# Patient Record
Sex: FEMALE | Race: WHITE | NOT HISPANIC OR LATINO | Employment: UNEMPLOYED | URBAN - METROPOLITAN AREA
[De-identification: names, ages, dates, MRNs, and addresses within clinical notes are randomized per-mention and may not be internally consistent; named-entity substitution may affect disease eponyms.]

---

## 2021-05-26 LAB — HM PAP SMEAR: NORMAL

## 2024-01-22 ENCOUNTER — TELEPHONE (OUTPATIENT)
Dept: OBSTETRICS AND GYNECOLOGY | Facility: CLINIC | Age: 29
End: 2024-01-22

## 2024-01-22 DIAGNOSIS — Z32.00 PREGNANCY EXAMINATION OR TEST, PREGNANCY UNCONFIRMED: Primary | ICD-10-CM

## 2024-01-22 NOTE — TELEPHONE ENCOUNTER
Patient is scheduled for her initial ob appt in March. Please sign attached order for HCG. Her LMP was 11-23

## 2024-01-23 ENCOUNTER — TELEPHONE (OUTPATIENT)
Dept: OBSTETRICS AND GYNECOLOGY | Facility: CLINIC | Age: 29
End: 2024-01-23

## 2024-01-23 NOTE — TELEPHONE ENCOUNTER
HUB TO RELAY    Left message for a patient.  has signed the order for her [facility]. She can come into office Monday-Friday from 9am-11am or 1 PM-4:30 PM to get this lab done.

## 2024-01-23 NOTE — TELEPHONE ENCOUNTER
Name: Benita KEEN     Relationship: SELF    Best Callback Number:     [phone]       HUB PROVIDED THE RELAY MESSAGE FROM THE OFFICE   PATIENT VOICED UNDERSTANDING AND HAS NO FURTHER QUESTIONS AT THIS TIME

## 2024-01-27 ENCOUNTER — HOSPITAL ENCOUNTER (EMERGENCY)
Facility: HOSPITAL | Age: 29
Discharge: HOME | End: 2024-01-27
Attending: EMERGENCY MEDICINE
Payer: MEDICAID

## 2024-01-27 ENCOUNTER — APPOINTMENT (OUTPATIENT)
Dept: ULTRASOUND IMAGING | Facility: HOSPITAL | Age: 29
End: 2024-01-27
Payer: MEDICAID

## 2024-01-27 VITALS
RESPIRATION RATE: 15 BRPM | DIASTOLIC BLOOD PRESSURE: 96 MMHG | OXYGEN SATURATION: 99 % | BODY MASS INDEX: 26.12 KG/M2 | HEART RATE: 113 BPM | TEMPERATURE: 98.2 F | SYSTOLIC BLOOD PRESSURE: 135 MMHG | HEIGHT: 62 IN

## 2024-01-27 DIAGNOSIS — O20.9 VAGINAL BLEEDING IN PREGNANCY, FIRST TRIMESTER: ICD-10-CM

## 2024-01-27 DIAGNOSIS — O23.41 URINARY TRACT INFECTION IN MOTHER DURING FIRST TRIMESTER OF PREGNANCY: Primary | ICD-10-CM

## 2024-01-27 LAB
ABO GROUP BLD: NORMAL
BACTERIA UR QL AUTO: ABNORMAL /HPF
BASOPHILS # BLD AUTO: 0.04 10*3/MM3 (ref 0–0.2)
BASOPHILS NFR BLD AUTO: 0.4 % (ref 0–1.5)
BILIRUB UR QL STRIP: NEGATIVE
CLARITY UR: ABNORMAL
COLOR UR: ABNORMAL
DEPRECATED RDW RBC AUTO: 40.3 FL (ref 37–54)
EOSINOPHIL # BLD AUTO: 0.03 10*3/MM3 (ref 0–0.4)
EOSINOPHIL NFR BLD AUTO: 0.3 % (ref 0.3–6.2)
ERYTHROCYTE [DISTWIDTH] IN BLOOD BY AUTOMATED COUNT: 12.4 % (ref 12.3–15.4)
GLUCOSE UR STRIP-MCNC: NEGATIVE MG/DL
HCG INTACT+B SERPL-ACNC: NORMAL MIU/ML
HCT VFR BLD AUTO: 40.5 % (ref 34–46.6)
HGB BLD-MCNC: 13.3 G/DL (ref 12–15.9)
HGB UR QL STRIP.AUTO: ABNORMAL
HOLD SPECIMEN: NORMAL
HOLD SPECIMEN: NORMAL
HYALINE CASTS UR QL AUTO: ABNORMAL /LPF
IMM GRANULOCYTES # BLD AUTO: 0.04 10*3/MM3 (ref 0–0.05)
IMM GRANULOCYTES NFR BLD AUTO: 0.4 % (ref 0–0.5)
KETONES UR QL STRIP: ABNORMAL
LEUKOCYTE ESTERASE UR QL STRIP.AUTO: ABNORMAL
LYMPHOCYTES # BLD AUTO: 1.72 10*3/MM3 (ref 0.7–3.1)
LYMPHOCYTES NFR BLD AUTO: 17.6 % (ref 19.6–45.3)
MCH RBC QN AUTO: 29 PG (ref 26.6–33)
MCHC RBC AUTO-ENTMCNC: 32.8 G/DL (ref 31.5–35.7)
MCV RBC AUTO: 88.4 FL (ref 79–97)
MONOCYTES # BLD AUTO: 0.56 10*3/MM3 (ref 0.1–0.9)
MONOCYTES NFR BLD AUTO: 5.7 % (ref 5–12)
NEUTROPHILS NFR BLD AUTO: 7.41 10*3/MM3 (ref 1.7–7)
NEUTROPHILS NFR BLD AUTO: 75.6 % (ref 42.7–76)
NITRITE UR QL STRIP: POSITIVE
NRBC BLD AUTO-RTO: 0 /100 WBC (ref 0–0.2)
PH UR STRIP.AUTO: 8 [PH] (ref 5–8)
PLATELET # BLD AUTO: 275 10*3/MM3 (ref 140–450)
PMV BLD AUTO: 9.4 FL (ref 6–12)
PROT UR QL STRIP: ABNORMAL
RBC # BLD AUTO: 4.58 10*6/MM3 (ref 3.77–5.28)
RBC # UR STRIP: ABNORMAL /HPF
REF LAB TEST METHOD: ABNORMAL
RH BLD: POSITIVE
SP GR UR STRIP: 1.02 (ref 1–1.03)
SQUAMOUS #/AREA URNS HPF: ABNORMAL /HPF
UROBILINOGEN UR QL STRIP: ABNORMAL
WBC # UR STRIP: ABNORMAL /HPF
WBC NRBC COR # BLD AUTO: 9.8 10*3/MM3 (ref 3.4–10.8)
WHOLE BLOOD HOLD COAG: NORMAL
WHOLE BLOOD HOLD SPECIMEN: NORMAL

## 2024-01-27 PROCEDURE — 81001 URINALYSIS AUTO W/SCOPE: CPT | Performed by: EMERGENCY MEDICINE

## 2024-01-27 PROCEDURE — 25010000002 CEFTRIAXONE PER 250 MG: Performed by: NURSE PRACTITIONER

## 2024-01-27 PROCEDURE — 86900 BLOOD TYPING SEROLOGIC ABO: CPT | Performed by: EMERGENCY MEDICINE

## 2024-01-27 PROCEDURE — 87077 CULTURE AEROBIC IDENTIFY: CPT | Performed by: EMERGENCY MEDICINE

## 2024-01-27 PROCEDURE — 87186 SC STD MICRODIL/AGAR DIL: CPT | Performed by: EMERGENCY MEDICINE

## 2024-01-27 PROCEDURE — 96375 TX/PRO/DX INJ NEW DRUG ADDON: CPT

## 2024-01-27 PROCEDURE — 96365 THER/PROPH/DIAG IV INF INIT: CPT

## 2024-01-27 PROCEDURE — 99284 EMERGENCY DEPT VISIT MOD MDM: CPT

## 2024-01-27 PROCEDURE — 76817 TRANSVAGINAL US OBSTETRIC: CPT

## 2024-01-27 PROCEDURE — 25010000002 ONDANSETRON PER 1 MG: Performed by: NURSE PRACTITIONER

## 2024-01-27 PROCEDURE — 86901 BLOOD TYPING SEROLOGIC RH(D): CPT | Performed by: EMERGENCY MEDICINE

## 2024-01-27 PROCEDURE — 25810000003 SODIUM CHLORIDE 0.9 % SOLUTION: Performed by: NURSE PRACTITIONER

## 2024-01-27 PROCEDURE — 84702 CHORIONIC GONADOTROPIN TEST: CPT | Performed by: EMERGENCY MEDICINE

## 2024-01-27 PROCEDURE — 87086 URINE CULTURE/COLONY COUNT: CPT | Performed by: EMERGENCY MEDICINE

## 2024-01-27 PROCEDURE — 85025 COMPLETE CBC W/AUTO DIFF WBC: CPT | Performed by: EMERGENCY MEDICINE

## 2024-01-27 RX ORDER — SODIUM CHLORIDE 0.9 % (FLUSH) 0.9 %
10 SYRINGE (ML) INJECTION AS NEEDED
Status: DISCONTINUED | OUTPATIENT
Start: 2024-01-27 | End: 2024-01-27 | Stop reason: HOSPADM

## 2024-01-27 RX ORDER — ONDANSETRON 2 MG/ML
4 INJECTION INTRAMUSCULAR; INTRAVENOUS ONCE
Status: COMPLETED | OUTPATIENT
Start: 2024-01-27 | End: 2024-01-27

## 2024-01-27 RX ORDER — CEPHALEXIN 500 MG/1
500 CAPSULE ORAL 2 TIMES DAILY
Qty: 14 CAPSULE | Refills: 0 | Status: SHIPPED | OUTPATIENT
Start: 2024-01-27 | End: 2024-02-03

## 2024-01-27 RX ADMIN — SODIUM CHLORIDE 1000 ML: 9 INJECTION, SOLUTION INTRAVENOUS at 17:26

## 2024-01-27 RX ADMIN — CEFTRIAXONE SODIUM 1000 MG: 1 INJECTION, POWDER, FOR SOLUTION INTRAMUSCULAR; INTRAVENOUS at 17:16

## 2024-01-27 RX ADMIN — ONDANSETRON HYDROCHLORIDE 4 MG: 2 SOLUTION INTRAMUSCULAR; INTRAVENOUS at 17:20

## 2024-01-27 NOTE — DISCHARGE INSTRUCTIONS
Rest, put your feet up as much as possible  Drink plenty of water  Take the full course of keflex as prescribed  Take tylenol as needed for pain  Follow up with your Ob/Gyn as soon as is feasible  Pelvic rest until you follow up  Return to the ER for worsening bleeding or pain, onset of fever, chest pain, shortness of breath or any other symptoms of concern

## 2024-01-27 NOTE — ED PROVIDER NOTES
Time: 3:43 PM EST  Date of encounter:  1/27/2024  Independent Historian/Clinical History and Information was obtained by:   Patient    History is limited by: N/A    Chief Complaint: vaginal bleeding      History of Present Illness:  Patient is a 28 y.o. year old female who presents to the emergency department for evaluation of vaginal bleeding. She says she is 9weeks pregnant, LMP 11/23/23. She had an ultrasound at [facility] a few weeks ago to confirm pregnancy. He appt with OB/Gyn is 3/14/24. She woke up from a nap this afternoon about 3pm and started having vaginal bleeding with large clots. She is complaining of lower abdominal cramping.    HPI    Patient Care Team  Primary Care Provider: Cori Waldrop APRN    Past Medical History:     Allergies   Allergen Reactions    Haemophilus Influenzae Vaccines Unknown - High Severity    Influenza Virus Vaccine [Influenza Virus Vac Live Quad] Unknown - High Severity     Past Medical History:   Diagnosis Date    Anemia complicating pregnancy, unspecified trimester     ASYMPTOMATIC    Cholelithiasis     Chronic hepatitis C complicating pregnancy, antepartum 05/05/2022 5/5/2022 Quantitative values qtrimester; referral to GI PP for treatment     Constipation, unspecified     Hepatitis C     ON MEDS- ON CURRENT PROBLEMS    History of blood transfusion [facility], G3     Hx of chlamydia infection     L4-L5 disc bulge 04/07/2015    Migraine     Other specified noninflammatory disorders of vagina     Unspecified viral hepatitis C without hepatic coma      Past Surgical History:   Procedure Laterality Date    CHOLECYSTECTOMY N/A 03/29/2023    Procedure: CHOLECYSTECTOMY LAPAROSCOPIC;  Surgeon: Lai Miller MD;  Location: formerly [facility] MAIN OR;  Service: General;  Laterality: N/A;    EYE SURGERY Bilateral     x2    HERNIA REPAIR  2009    Umbilical, mesh    WISDOM TOOTH EXTRACTION       Family History   Problem Relation Age of Onset    Breast cancer Mother      "Ovarian cancer Mother     Thyroid disease Maternal Grandmother     Heart disease Maternal Grandmother     Diabetes Maternal Grandmother     Breast cancer Maternal Aunt     Uterine cancer Neg Hx     Colon cancer Neg Hx     Melanoma Neg Hx     Clotting disorder Neg Hx        Home Medications:  Prior to Admission medications    Medication Sig Start Date End Date Taking? Authorizing Provider   amoxicillin (AMOXIL) 500 MG capsule Take 1 capsule by mouth 2 (Two) Times a Day. 1/3/24   Wendy Buck APRN   diclofenac (VOLTAREN) 50 MG EC tablet Take 1 tablet by mouth 2 (Two) Times a Day As Needed.    Provider, Historical, MD        Social History:   Social History     Tobacco Use    Smoking status: Never     Passive exposure: Never    Smokeless tobacco: Never   Vaping Use    Vaping Use: Former    Substances: Nicotine, Flavoring    Devices: Disposable   Substance Use Topics    Alcohol use: Never    Drug use: Never         Review of Systems:  Review of Systems   Constitutional: Negative.    HENT: Negative.     Eyes: Negative.    Respiratory: Negative.     Cardiovascular: Negative.    Gastrointestinal:  Positive for abdominal pain.   Endocrine: Negative.    Genitourinary:  Positive for vaginal bleeding.   Musculoskeletal: Negative.    Skin: Negative.    Allergic/Immunologic: Negative.    Neurological: Negative.    Hematological: Negative.    Psychiatric/Behavioral: Negative.          Physical Exam:  /96 (BP Location: Right arm, Patient Position: Sitting)   Pulse 113   Temp 98.2 °F (36.8 °C) (Oral)   Resp 15   Ht 157.5 cm (62.01\")   LMP 11/23/2023 (Exact Date)   SpO2 99%   BMI 26.12 kg/m²     Physical Exam  Constitutional:       Appearance: Normal appearance.   HENT:      Head: Normocephalic and atraumatic.      Nose: Nose normal.      Mouth/Throat:      Mouth: Mucous membranes are moist.   Eyes:      Pupils: Pupils are equal, round, and reactive to light.   Cardiovascular:      Rate and Rhythm: Normal rate " and regular rhythm.      Pulses: Normal pulses.   Pulmonary:      Effort: Pulmonary effort is normal.      Breath sounds: Normal breath sounds.   Abdominal:      General: Abdomen is flat. Bowel sounds are normal.      Palpations: Abdomen is soft.      Tenderness: There is abdominal tenderness in the suprapubic area.   Musculoskeletal:         General: Normal range of motion.      Cervical back: Normal range of motion.   Skin:     General: Skin is warm and dry.      Capillary Refill: Capillary refill takes less than 2 seconds.   Neurological:      General: No focal deficit present.      Mental Status: She is alert and oriented to person, place, and time. Mental status is at baseline.   Psychiatric:         Mood and Affect: Mood normal.                  Procedures:  Procedures      Medical Decision Making:      Comorbidities that affect care:    Pregnancy    External Notes reviewed:    None      The following orders were placed and all results were independently analyzed by me:  Orders Placed This Encounter   Procedures    Urine Culture - Urine,    US Ob Transvaginal    Urinalysis With Culture If Indicated - Urine, Clean Catch    [place] Draw    hCG, Quantitative, Pregnancy    CBC Auto Differential    Urinalysis, Microscopic Only - Urine, Clean Catch    NPO Diet NPO Type: Strict NPO    Undress & Gown    Vital Signs    Orthostatic Blood Pressure    Supplies To Bedside - Notify MD When Ready- Pelvic Cart / Set Up    Pulse Oximetry    Oxygen Therapy- Nasal Cannula; Titrate 1-6 LPM Per SpO2; 90 - 95%    ABO / Rh    Insert Peripheral IV    CBC & Differential    Green Top (Gel)    Lavender Top    Gold Top - SST    Light Blue Top       Medications Given in the Emergency Department:  Medications   sodium chloride 0.9 % flush 10 mL (has no administration in time range)   sodium chloride 0.9 % bolus 1,000 mL (1,000 mL Intravenous New Bag 1/27/24 1726)   ondansetron (ZOFRAN) injection 4 mg (4 mg Intravenous Given 1/27/24 1720)    cefTRIAXone (ROCEPHIN) 1000 mg/50 mL 0.9% sodium chloride MBP (1,000 mg Intravenous New Bag 1/27/24 1716)        ED Course:         Labs:    Lab Results (last 24 hours)       Procedure Component Value Units Date/Time    CBC & Differential [[id number]]  (Abnormal) Collected: 01/27/24 1613    Specimen: Blood Updated: 01/27/24 1622    Narrative:      The following orders were created for panel order CBC & Differential.  Procedure                               Abnormality         Status                     ---------                               -----------         ------                     CBC Auto Differential[[id number]]        Abnormal            Final result                 Please view results for these tests on the individual orders.    hCG, Quantitative, Pregnancy [[id number]] Collected: 01/27/24 1613    Specimen: Blood Updated: 01/27/24 1722     HCG Quantitative 103,125.00 mIU/mL     Narrative:      HCG Ranges by Gestational Age    Females - non-pregnant premenopausal   </= 1mIU/mL HCG  Females - postmenopausal               </= 7mIU/mL HCG    3 Weeks       5.4   -      72 mIU/mL  4 Weeks      10.2   -     708 mIU/mL  5 Weeks       217   -   8,245 mIU/mL  6 Weeks       152   -  32,177 mIU/mL  7 Weeks     4,059   - 153,767 mIU/mL  8 Weeks    31,366   - 149,094 mIU/mL  9 Weeks    59,109   - 135,901 mIU/mL  10 Weeks   44,186   - 170,409 mIU/mL  12 Weeks   27,107   - 201,615 mIU/mL  14 Weeks   24,302   -  93,646 mIU/mL  15 Weeks   12,540   -  69,747 mIU/mL  16 Weeks    8,904   -  55,332 mIU/mL  17 Weeks    8,240   -  51,793 mIU/mL  18 Weeks    9,649   -  55,271 mIU/mL      CBC Auto Differential [[id number]]  (Abnormal) Collected: 01/27/24 1613    Specimen: Blood Updated: 01/27/24 1622     WBC 9.80 10*3/mm3      RBC 4.58 10*6/mm3      Hemoglobin 13.3 g/dL      Hematocrit 40.5 %      MCV 88.4 fL      MCH 29.0 pg      MCHC 32.8 g/dL      RDW 12.4 %      RDW-SD 40.3 fl      MPV 9.4 fL      Platelets 275 10*3/mm3       Neutrophil % 75.6 %      Lymphocyte % 17.6 %      Monocyte % 5.7 %      Eosinophil % 0.3 %      Basophil % 0.4 %      Immature Grans % 0.4 %      Neutrophils, Absolute 7.41 10*3/mm3      Lymphocytes, Absolute 1.72 10*3/mm3      Monocytes, Absolute 0.56 10*3/mm3      Eosinophils, Absolute 0.03 10*3/mm3      Basophils, Absolute 0.04 10*3/mm3      Immature Grans, Absolute 0.04 10*3/mm3      nRBC 0.0 /100 WBC     Urinalysis With Culture If Indicated - Urine, Clean Catch [[id number]]  (Abnormal) Collected: 01/27/24 1622    Specimen: Urine, Clean Catch Updated: 01/27/24 1648     Color, UA [place]     Appearance, UA Turbid     pH, UA 8.0     Specific Gravity, UA 1.022     Glucose, UA Negative     Ketones, UA Trace     Bilirubin, UA Negative     Blood, UA Large (3+)     Protein, UA --     Comment: Result not available due to interfering substances.        Leuk Esterase, UA Small (1+)     Nitrite, UA Positive     Urobilinogen, UA 1.0 E.U./dL    Narrative:      In absence of clinical symptoms, the presence of pyuria, bacteria, and/or nitrites on the urinalysis result does not correlate with infection.    Urinalysis, Microscopic Only - Urine, Clean Catch [[id number]]  (Abnormal) Collected: 01/27/24 1622    Specimen: Urine, Clean Catch Updated: 01/27/24 1648     RBC, UA Too Numerous to Count /HPF      WBC, UA 11-20 /HPF      Bacteria, UA 4+ /HPF      Squamous Epithelial Cells, UA 7-12 /HPF      Hyaline Casts, UA 0-2 /LPF      Methodology Automated Microscopy    Urine Culture - Urine, Urine, Clean Catch [[id number]] Collected: 01/27/24 1622    Specimen: Urine, Clean Catch Updated: 01/27/24 1636             Imaging:    US Ob Transvaginal    Result Date: 1/27/2024  PROCEDURE: US OB TRANSVAGINAL  COMPARISON: None  INDICATIONS: vaginal bleeding  TECHNIQUE: Transvaginal OB pelvic ultrasound.   FINDINGS:   Single living intrauterine gestation.  Estimated gestational age by ultrasound 7 weeks 6 days.  Estimated date of delivery 9/8/2024.   Crown rump length 1.47 cm. Fetal heart rate 182 bpm.  The ovaries are not visualized.  No abnormal pelvic fluid collections are identified.  IMPRESSION: Single living intrauterine gestation.  Estimated gestational age 7 weeks 6 days.  Estimated date of delivery 9/8/2024.   BRODERICK ZIEGLER MD       Electronically Signed and Approved By: BRODERICK ZIEGLER MD on 1/27/2024 at 18:15                Differential Diagnosis and Discussion:    Vaginal Discharge: Differential diagnosis includes but is not limited to bacterial vaginosis, candidiasis, trichomonas vaginitis, cervicitis, rectovaginal fistula, irritants and allergens, foreign body, pelvic inflammatory disease.    All labs were reviewed and interpreted by me.  Ultrasound impression was interpreted by me.     MDM     Amount and/or Complexity of Data Reviewed  Clinical lab tests: reviewed  Tests in the radiology section of CPT®: reviewed                 Patient Care Considerations:           Consultants/Shared Management Plan:    None    Social Determinants of Health:           Disposition and Care Coordination:    Discharged: The patient is suitable and stable for discharge with no need for consideration of admission.    I have explained the patient´s condition, diagnoses and treatment plan based on the information available to me at this time. I have answered questions and addressed any concerns. The patient has a good  understanding of the patient´s diagnosis, condition, and treatment plan as can be expected at this point. The vital signs have been stable. The patient´s condition is stable and appropriate for discharge from the emergency department.      The patient will pursue further outpatient evaluation with the primary care physician or other designated or consulting physician as outlined in the discharge instructions. They are agreeable to this plan of care and follow-up instructions have been explained in detail. The patient has received these instructions  in written format and have expressed an understanding of the discharge instructions. The patient is aware that any significant change in condition or worsening of symptoms should prompt an immediate return to this or the closest emergency department or call to 911.  I have explained discharge medications and the need for follow up with the patient/caretakers. This was also printed in the discharge instructions. Patient was discharged with the following medications and follow up:      Medication List        New Prescriptions      cephalexin 500 MG capsule  Commonly known as: KEFLEX  Take 1 capsule by mouth 2 (Two) Times a Day for 7 days.            Stop      amoxicillin 500 MG capsule  Commonly known as: AMOXIL     diclofenac 50 MG EC tablet  Commonly known as: VOLTAREN               Where to Get Your Medications        These medications were sent to [id number] - [place], [place] - [address] AT [facility] [address] & [place] - [phone]  - [phone] FX  [address], [place] [address]      Phone: [phone]   cephalexin 500 MG capsule      Cori Waldrop, APRN  [address]  [address]  [place] [address] [phone]      As needed       Final diagnoses:   Urinary tract infection in mother during first trimester of pregnancy   Vaginal bleeding in pregnancy, first trimester        ED Disposition       ED Disposition   Discharge    Condition   Stable    Comment   --               This medical record created using voice recognition software.             Melody Lipscomb, APRN  01/27/24 3363

## 2024-01-27 NOTE — Clinical Note
[facility] EMERGENCY ROOM  [address]  [place] [address]  Phone: [phone]    Benita KEEN was seen and treated in our emergency department on 1/27/2024.  She may return to work on 01/30/2024.         Thank you for choosing [facility].    Melody Lipscomb, APRN

## 2024-01-29 LAB — BACTERIA SPEC AEROBE CULT: ABNORMAL

## 2024-01-29 NOTE — TELEPHONE ENCOUNTER
Left message for patient after seeing she was seen in the ER on 01/27 for vaginal bleeding. Her ultrasound showed a viable IUP at an estimated GA 7w6d. Her BHCG was 103,125.00. I called her to follow up with her on that visit but she did not answer I left a voicemail. Her initial OB appointment is on 03/14. Please advise if any recommendations.

## 2024-01-31 ENCOUNTER — TELEPHONE (OUTPATIENT)
Dept: OBSTETRICS AND GYNECOLOGY | Facility: CLINIC | Age: 29
End: 2024-01-31
Payer: MEDICAID

## 2024-01-31 NOTE — TELEPHONE ENCOUNTER
"Patient called states she was seen at the ER in [place] for bleeding in pregnancy. She states she is still having bleeding and can \"feel it pour out of her\" and changes her pad three times an hour. No records of ER visit in her account after trying multiple ways to pull her up to see if duplicate. Patient advised if bleeding heavily she needs to go back to the ER for evaluation.   "

## 2024-02-01 NOTE — TELEPHONE ENCOUNTER
Spoke with pt who stated she called the office yesterday for heavy vaginal bleeding and was advised to go to the ER but she did not go. She states she contacted her PCP who told her the bleeding was from her having a bad UTI when she was seen in the ER on 01/27. She states the bleeding has stopped. I was able to move her appt up to a sooner date. She will fu as needed until that appointment.

## 2024-02-19 ENCOUNTER — OFFICE VISIT (OUTPATIENT)
Dept: OBSTETRICS AND GYNECOLOGY | Facility: CLINIC | Age: 29
End: 2024-02-19
Payer: COMMERCIAL

## 2024-02-19 VITALS
HEART RATE: 90 BPM | SYSTOLIC BLOOD PRESSURE: 100 MMHG | BODY MASS INDEX: 28.71 KG/M2 | DIASTOLIC BLOOD PRESSURE: 71 MMHG | WEIGHT: 157 LBS

## 2024-02-19 DIAGNOSIS — O20.0 THREATENED ABORTION: Primary | ICD-10-CM

## 2024-02-19 LAB
B-HCG UR QL: POSITIVE
EXPIRATION DATE: ABNORMAL
HCG INTACT+B SERPL-ACNC: 196 MIU/ML
INTERNAL NEGATIVE CONTROL: NEGATIVE
INTERNAL POSITIVE CONTROL: ABNORMAL
Lab: ABNORMAL

## 2024-02-19 PROCEDURE — 84702 CHORIONIC GONADOTROPIN TEST: CPT | Performed by: NURSE PRACTITIONER

## 2024-02-19 PROCEDURE — 81025 URINE PREGNANCY TEST: CPT | Performed by: NURSE PRACTITIONER

## 2024-02-19 PROCEDURE — 99213 OFFICE O/P EST LOW 20 MIN: CPT | Performed by: NURSE PRACTITIONER

## 2024-02-19 NOTE — PROGRESS NOTES
Chief Complaint   Patient presents with    Possible Pregnancy     Recently seen in ER heavy bleeding for 1 week           HPI  Benita KEEN is a 28 y.o. female, , who presents for follow up on bleeding in early pregnancy.    LMP 23, started bleeding on 24 and was evaluated in ED.  States was told she had a severe UTI and that could be causing her bleeding.    US on 24 showed a living IUP which measured 7w6d.  This would make her 11w1d today.  Was treated with Keflex for UTI.    She reports she bled for about a week.   States lost a lot of blood and passed blood clots bigger than her first.  Bleeding has since stopped, having a brown mucus discharge.   Is still having nausea and breast tenderness.   Has had intercourse since her bleeding stopped.  US Ob Transvaginal (2024 18:06)      Recent Tests:  US: brief bedside TAS does not demonstrate IUP  Rh Status: Positive      The additional following portions of the patient's history were reviewed and updated as appropriate: allergies, current medications, past family history, past medical history, past social history, past surgical history, and problem list.    Review of Systems      I have reviewed and agree with the HPI, ROS, and historical information as entered above. Tam Parks, APRN    Objective   /71   Pulse 90   Wt 71.2 kg (157 lb)   LMP 2023 (Exact Date)   BMI 28.71 kg/m²     Physical Exam       Assessment and Plan    Problem List Items Addressed This Visit          Gravid and     Threatened  - Primary    Overview     Seen in ED on 24, IUP at 7w6d.  Bled for a week, passing large clots.  Bleeding has resolved.  No IUP seen on brief TAS, will ordered beta HCG and repeat ultrasound.  Pelvic rest.  Discussed likely SAB, reviewed needed to follow beta HCG to zero. Emotional support provided.         Relevant Orders    hCG, Quantitative, Pregnancy    POC Pregnancy, Urine (Completed)     US Ob Transvaginal       Threatened AB  Repeat U/S in 1 week(s).  Pelvic Rest.  No douching, intercourse or use of tampons.  Call for an increase in bleeding, abdominal pain, or fever.  Emotional support provided   Return for as needed.        Tam Parks, APRN  02/19/2024

## 2024-02-20 ENCOUNTER — TELEPHONE (OUTPATIENT)
Dept: OBSTETRICS AND GYNECOLOGY | Facility: CLINIC | Age: 29
End: 2024-02-20
Payer: COMMERCIAL

## 2024-02-20 DIAGNOSIS — O03.9 SAB (SPONTANEOUS ABORTION): Primary | ICD-10-CM

## 2024-02-20 NOTE — TELEPHONE ENCOUNTER
----- Message from MIKEL Prieto sent at 2/20/2024  9:58 AM EST -----  Please let patient know her beta HCG has dropped to 196, indicating she has miscarried her baby.  Recommend repeat level in one week, pelvic rest until after repeat level drawn.  Does not need to have follow up ultrasound performed, please cancel.

## 2024-07-24 ENCOUNTER — TELEPHONE (OUTPATIENT)
Dept: OBSTETRICS AND GYNECOLOGY | Facility: CLINIC | Age: 29
End: 2024-07-24
Payer: COMMERCIAL

## 2024-07-24 NOTE — TELEPHONE ENCOUNTER
7/24 number listed for patient  not a working number - attempted the spouse  left generic message to have pt call to r/s appt. Provider out of office now due to call schedule change.  Hub to read: transfer to office as days are held for reschedule

## 2024-07-25 ENCOUNTER — OFFICE VISIT (OUTPATIENT)
Dept: INTERNAL MEDICINE | Facility: CLINIC | Age: 29
End: 2024-07-25
Payer: COMMERCIAL

## 2024-07-25 DIAGNOSIS — Z32.01 POSITIVE PREGNANCY TEST: ICD-10-CM

## 2024-07-25 DIAGNOSIS — N92.6 MISSED PERIOD: Primary | ICD-10-CM

## 2024-07-25 LAB
B-HCG UR QL: POSITIVE
EXPIRATION DATE: ABNORMAL
INTERNAL NEGATIVE CONTROL: ABNORMAL
INTERNAL POSITIVE CONTROL: ABNORMAL
Lab: ABNORMAL

## 2024-07-25 PROCEDURE — 81025 URINE PREGNANCY TEST: CPT | Performed by: NURSE PRACTITIONER

## 2024-07-25 PROCEDURE — 99212 OFFICE O/P EST SF 10 MIN: CPT | Performed by: NURSE PRACTITIONER

## 2024-07-25 NOTE — PROGRESS NOTES
Chief Complaint  No period x2 months     Deysi KEEN presents to [facility]  History of Present Illness    Patient presents to the clinic with no menstrual cycle x2 months.     Current Outpatient Medications   Medication Instructions    benzonatate (TESSALON) 100 mg, Oral, 3 Times Daily PRN    promethazine-dextromethorphan (PROMETHAZINE-DM) 6.25-15 MG/5ML syrup 5 mL, Oral, 4 Times Daily PRN       The following portions of the patient's history were reviewed and updated as appropriate: allergies, current medications, past family history, past medical history, past social history, past surgical history, and problem list.    Objective   Vital Signs:   There were no vitals taken for this visit.    BP Readings from Last 3 Encounters:   03/25/24 100/83   02/19/24 100/71   01/27/24 135/96     Wt Readings from Last 3 Encounters:   03/25/24 71.2 kg (156 lb 15.5 oz)   02/19/24 71.2 kg (157 lb)   10/07/23 64.8 kg (142 lb 13.7 oz)     BMI is >= 25 and <30. (Overweight) The following options were offered after discussion;: weight loss educational material (shared in after visit summary), exercise counseling/recommendations, and nutrition counseling/recommendations     Physical Exam     Appearance: No acute distress, well-nourished  Head: normocephalic, atraumatic  Eyes: extraocular movements intact, no scleral icterus, no conjunctival injection  Ears, Nose, and Throat: external ears normal, nares patent, moist mucous membranes  Cardiovascular: regular rate and rhythm. no murmurs, rubs, or gallops. no edema  Respiratory: breathing comfortably, symmetric chest rise, clear to auscultation bilaterally. No wheezes, rales, or rhonchi.  Neuro: alert and oriented to time, place, and person. Normal gait  Psych: normal mood and affect     Result Review :   The following data was reviewed by: MIKEL Mahoney on 07/25/2024:  Common labs           1/27/2024    16:13   Common Labs   WBC 9.80    Hemoglobin 13.3    Hematocrit 40.5    Platelets 275             Lab Results   Component Value Date    SARSANTIGEN Not Detected 01/03/2024    COVID19 Not Detected 12/03/2022    RAPFLUA Negative 09/22/2023    RAPFLUB Negative 09/22/2023    FLUAAG Not Detected 01/03/2024    FLUBAG Not Detected 01/03/2024    RAPSCRN Negative 03/25/2024    POCPREGUR Positive (A) 07/25/2024    BILIRUBINUR Negative 01/27/2024            Assessment and Plan    Diagnoses and all orders for this visit:    1. Missed period (Primary)  -     POCT pregnancy, urine    2. Positive pregnancy test      - start prenatal vitamin.   - schedule OB appt.     There are no discontinued medications.       Follow Up   No follow-ups on file.  Patient was given instructions and counseling regarding her condition or for health maintenance advice. Please see specific information pulled into the AVS if appropriate.       MIKEL Mahoney  07/25/24  10:37 EDT

## 2024-07-26 NOTE — TELEPHONE ENCOUNTER
7/26/24 Attempted to reach the patient number listed for patient  not a working number -Hub to read: transfer to office as days are held for reschedule      **Sent patient a letter regarding needing to do the reschedule

## 2024-08-05 ENCOUNTER — TELEPHONE (OUTPATIENT)
Dept: OBSTETRICS AND GYNECOLOGY | Facility: CLINIC | Age: 29
End: 2024-08-05
Payer: COMMERCIAL

## 2024-08-05 DIAGNOSIS — O36.80X0 PREGNANCY WITH INCONCLUSIVE FETAL VIABILITY, SINGLE OR UNSPECIFIED FETUS: Primary | ICD-10-CM

## 2024-08-08 ENCOUNTER — LAB (OUTPATIENT)
Dept: OBSTETRICS AND GYNECOLOGY | Facility: CLINIC | Age: 29
End: 2024-08-08
Payer: COMMERCIAL

## 2024-08-08 DIAGNOSIS — O36.80X0 PREGNANCY WITH INCONCLUSIVE FETAL VIABILITY, SINGLE OR UNSPECIFIED FETUS: ICD-10-CM

## 2024-08-08 LAB — HCG INTACT+B SERPL-ACNC: NORMAL MIU/ML

## 2024-08-08 PROCEDURE — 84702 CHORIONIC GONADOTROPIN TEST: CPT | Performed by: NURSE PRACTITIONER

## 2024-08-09 ENCOUNTER — TELEPHONE (OUTPATIENT)
Dept: OBSTETRICS AND GYNECOLOGY | Facility: CLINIC | Age: 29
End: 2024-08-09
Payer: COMMERCIAL

## 2024-08-09 NOTE — TELEPHONE ENCOUNTER
"Hub staff attempted to follow warm transfer process and was unsuccessful     Caller: Benita KEEN \"Lalita\"    Relationship to patient: Self    Best call back number: [phone] (home)       Patient is needing: PT CALLED BACK FOR [facility] REGARDING LAB RESULTS. PLEASE CALL BACK, OK TO [facility].    "

## 2024-08-12 ENCOUNTER — INITIAL PRENATAL (OUTPATIENT)
Dept: OBSTETRICS AND GYNECOLOGY | Facility: CLINIC | Age: 29
End: 2024-08-12
Payer: COMMERCIAL

## 2024-08-12 VITALS — BODY MASS INDEX: 27.61 KG/M2 | DIASTOLIC BLOOD PRESSURE: 68 MMHG | WEIGHT: 151 LBS | SYSTOLIC BLOOD PRESSURE: 107 MMHG

## 2024-08-12 DIAGNOSIS — Z34.80 SUPERVISION OF OTHER NORMAL PREGNANCY, ANTEPARTUM: Primary | ICD-10-CM

## 2024-08-12 PROBLEM — O20.0 THREATENED ABORTION: Status: RESOLVED | Noted: 2024-02-19 | Resolved: 2024-08-12

## 2024-08-12 LAB
ABO GROUP BLD: NORMAL
B-HCG UR QL: POSITIVE
BASOPHILS # BLD AUTO: 0.03 10*3/MM3 (ref 0–0.2)
BASOPHILS NFR BLD AUTO: 0.5 % (ref 0–1.5)
BLD GP AB SCN SERPL QL: NEGATIVE
DEPRECATED RDW RBC AUTO: 49.7 FL (ref 37–54)
EOSINOPHIL # BLD AUTO: 0.05 10*3/MM3 (ref 0–0.4)
EOSINOPHIL NFR BLD AUTO: 0.8 % (ref 0.3–6.2)
ERYTHROCYTE [DISTWIDTH] IN BLOOD BY AUTOMATED COUNT: 15.3 % (ref 12.3–15.4)
EXPIRATION DATE: ABNORMAL
GLUCOSE UR STRIP-MCNC: NEGATIVE MG/DL
HBV SURFACE AG SERPL QL IA: NORMAL
HCT VFR BLD AUTO: 38.2 % (ref 34–46.6)
HCV AB SER QL: REACTIVE
HGB BLD-MCNC: 12.2 G/DL (ref 12–15.9)
HIV 1+2 AB+HIV1 P24 AG SERPL QL IA: NORMAL
IMM GRANULOCYTES # BLD AUTO: 0.01 10*3/MM3 (ref 0–0.05)
IMM GRANULOCYTES NFR BLD AUTO: 0.2 % (ref 0–0.5)
INTERNAL NEGATIVE CONTROL: NEGATIVE
INTERNAL POSITIVE CONTROL: ABNORMAL
LYMPHOCYTES # BLD AUTO: 1.49 10*3/MM3 (ref 0.7–3.1)
LYMPHOCYTES NFR BLD AUTO: 22.9 % (ref 19.6–45.3)
Lab: ABNORMAL
MCH RBC QN AUTO: 28.3 PG (ref 26.6–33)
MCHC RBC AUTO-ENTMCNC: 31.9 G/DL (ref 31.5–35.7)
MCV RBC AUTO: 88.6 FL (ref 79–97)
MONOCYTES # BLD AUTO: 0.48 10*3/MM3 (ref 0.1–0.9)
MONOCYTES NFR BLD AUTO: 7.4 % (ref 5–12)
NEUTROPHILS NFR BLD AUTO: 4.46 10*3/MM3 (ref 1.7–7)
NEUTROPHILS NFR BLD AUTO: 68.2 % (ref 42.7–76)
NRBC BLD AUTO-RTO: 0 /100 WBC (ref 0–0.2)
PLATELET # BLD AUTO: 243 10*3/MM3 (ref 140–450)
PMV BLD AUTO: 10.5 FL (ref 6–12)
PROT UR STRIP-MCNC: NEGATIVE MG/DL
RBC # BLD AUTO: 4.31 10*6/MM3 (ref 3.77–5.28)
RH BLD: POSITIVE
WBC NRBC COR # BLD AUTO: 6.52 10*3/MM3 (ref 3.4–10.8)

## 2024-08-12 PROCEDURE — 80307 DRUG TEST PRSMV CHEM ANLYZR: CPT | Performed by: NURSE PRACTITIONER

## 2024-08-12 PROCEDURE — 80081 OBSTETRIC PANEL INC HIV TSTG: CPT | Performed by: NURSE PRACTITIONER

## 2024-08-12 PROCEDURE — 81025 URINE PREGNANCY TEST: CPT | Performed by: NURSE PRACTITIONER

## 2024-08-12 PROCEDURE — 87086 URINE CULTURE/COLONY COUNT: CPT | Performed by: NURSE PRACTITIONER

## 2024-08-12 PROCEDURE — 87624 HPV HI-RISK TYP POOLED RSLT: CPT | Performed by: NURSE PRACTITIONER

## 2024-08-12 PROCEDURE — 87591 N.GONORRHOEAE DNA AMP PROB: CPT | Performed by: NURSE PRACTITIONER

## 2024-08-12 PROCEDURE — 86803 HEPATITIS C AB TEST: CPT | Performed by: NURSE PRACTITIONER

## 2024-08-12 PROCEDURE — G0123 SCREEN CERV/VAG THIN LAYER: HCPCS | Performed by: NURSE PRACTITIONER

## 2024-08-12 PROCEDURE — 87661 TRICHOMONAS VAGINALIS AMPLIF: CPT | Performed by: NURSE PRACTITIONER

## 2024-08-12 PROCEDURE — 87491 CHLMYD TRACH DNA AMP PROBE: CPT | Performed by: NURSE PRACTITIONER

## 2024-08-12 PROCEDURE — 99214 OFFICE O/P EST MOD 30 MIN: CPT | Performed by: NURSE PRACTITIONER

## 2024-08-12 NOTE — PROGRESS NOTES
OB Initial Visit    CC- Here for care of current pregnancy, first visit    Subjective:  29 y.o.  presenting for her first obstetrical visit.    LMP: Patient's last menstrual period was 2024.     Pt has no complaints, is doing well    Happy for pregnancy, anxious due to her miscarriage earlier this year.     Reviewed and updated:  OBHx, GYNHx (STDs), PMHx, Medications, Allergies, PSHx, Social Hx, Preventative Hx (PAP), Hx of abuse/safe environment, Vaccine Hx including hx of chickenpox or vaccine, Genetic Hx (pt, FOB, both families).        Objective:  /68   Wt 68.5 kg (151 lb)   LMP 2024   BMI 27.61 kg/m²      Urine pregnancy test is positive     General- NAD, alert and oriented, appropriate  Psych- Normal mood, good memory  Neck- No masses, no thyroid enlargement  CV- Regular rhythm, no murnurs  Resp- CTA to bases, no wheezes  Abdomen- Soft, non distended, non tender, no masses    Breast left- deferred  Breast right- deferred    External genitalia- Normal, no lesions  Urethra- Normal, no masses, non tender  Vagina- Normal, no discharge  Bladder- Normal, no masses, non tender  Cvx- Normal, no lesions, no discharge, no CMT  Uterus- Normal shape and consistency, non tender, Consistent with dates, Bedside US consistent with dates.  -160..    Adnexa- Normal, no mass, non tender    Lymphatic- No palpable neck, axillary, or groin nodes  Ext- No edema, no cyanosis    Skin- No lesions, no rashes, no acanthosis nigricans    Assessment and Plan:  12w0d  Diagnoses and all orders for this visit:    1. Supervision of other normal pregnancy, antepartum (Primary)  Overview:  ELVIS finalized: 25 per LMP, dating scan scheduled    Optional testing NIPS,CF/SMA,AFP:  Carrier screen pending, NIPS pending    COVID: Fully vaccinated  Tdap:  RSV:  Flu:    Rhogam:  1hr Glucola:  FU TPA w glucola:  ? Desires Sterilization:    Anatomy US:  FU US:    PROBLEM LIST/PLAN:           Orders:  -     POC  Urinalysis Dipstick  -     IGP,CtNgTv,Apt HPV,rfx 16 / 18,45  -     OB Panel With HIV and RPR  -     Urine Culture - Urine, Urine, Clean Catch  -     Urine Drug Screen - Urine, Clean Catch  -     POC Pregnancy, Urine  -     Home Horizon 14 (Pan-Ethnic Standard) - Blood,  -     Home Panorama Prenatal Test: Chromosomes 13, 18, 21, X & Y: Triploidy 22Q.11.2 Deletion - Blood,        Genetic Screening:   CF  NIPS    Vaccines:   s/p COVID vaccine    Counseling:   Nutrition discussed, calories, activity/exercise in pregnancy  Discussed dietary restrictions/safety food preparation in pregnancy  Reviewed what to expect prenatal visits, office providers (female and male) and covering [facility] Hospitalists/Dr. Ocampo  Appropriate trimester precautions provided, N/V, vag bleeding, cramping  Questions answered    Labs:   Prenatal labs, cultures, and PAP performed (prn)    Return in about 4 weeks (around 9/9/2024) for [facility] Office, OB follow up.      Tam Parks, APRN  08/12/2024    [facility]  [facility]  [address] [address]  Dept: [phone]  Dept Fax: [phone]  Loc: [phone]

## 2024-08-13 ENCOUNTER — TELEPHONE (OUTPATIENT)
Dept: OBSTETRICS AND GYNECOLOGY | Facility: CLINIC | Age: 29
End: 2024-08-13
Payer: COMMERCIAL

## 2024-08-13 DIAGNOSIS — B18.2 CHRONIC HEPATITIS C COMPLICATING PREGNANCY, ANTEPARTUM: Primary | ICD-10-CM

## 2024-08-13 DIAGNOSIS — O98.419 CHRONIC HEPATITIS C COMPLICATING PREGNANCY, ANTEPARTUM: Primary | ICD-10-CM

## 2024-08-13 LAB
AMPHET+METHAMPHET UR QL: NEGATIVE
BACTERIA SPEC AEROBE CULT: NORMAL
BARBITURATES UR QL SCN: NEGATIVE
BENZODIAZ UR QL SCN: NEGATIVE
CANNABINOIDS SERPL QL: NEGATIVE
COCAINE UR QL: NEGATIVE
FENTANYL UR-MCNC: NEGATIVE NG/ML
METHADONE UR QL SCN: NEGATIVE
OPIATES UR QL: NEGATIVE
OXYCODONE UR QL SCN: NEGATIVE
RPR SER QL: NORMAL

## 2024-08-13 NOTE — TELEPHONE ENCOUNTER
"  Caller: Benita KEEN \"Lalita\"    Relationship: Self    Best call back number:    [phone]    What is the best time to reach you:     AFTER 4:00 PM    Who are you requesting to speak with (clinical staff, provider,  specific staff member): LIAM    What was the call regarding:     PT RETURNING AMBERS CALLS  PT IS AT WORK AND GETS DONE AT 4:00 AND CAN ANSWER HER PHONE THEN  "

## 2024-08-13 NOTE — TELEPHONE ENCOUNTER
"Caller: Benita KEEN \"Lalita\"    Relationship to patient: Self    Best call back number: [phone]    Patient is needing: PT RETURNING MISSED CALL FROM LIAM THIS MORNING.   "

## 2024-08-14 PROBLEM — O09.899 RUBELLA NON-IMMUNE STATUS, ANTEPARTUM: Status: ACTIVE | Noted: 2024-08-14

## 2024-08-14 PROBLEM — Z28.39 RUBELLA NON-IMMUNE STATUS, ANTEPARTUM: Status: ACTIVE | Noted: 2024-08-14

## 2024-08-14 LAB — RUBV IGG SERPL IA-ACNC: <0.9 INDEX

## 2024-08-18 LAB
Lab: NEGATIVE
Lab: NORMAL
NTRA 22Q11.2 DELETION SYNDROME POPULATION-BASED RISK TEXT: NORMAL
NTRA 22Q11.2 DELETION SYNDROME RESULT TEXT: NORMAL
NTRA 22Q11.2 DELETION SYNDROME RISK SCORE TEXT: NORMAL
NTRA ALPHA-THALASSEMIA: NEGATIVE
NTRA BETA-HEMOGLOBINOPATHIES: NEGATIVE
NTRA CANAVAN DISEASE: NEGATIVE
NTRA CYSTIC FIBROSIS: NEGATIVE
NTRA DUCHENNE/BECKER MUSCULAR DYSTROPHY: NEGATIVE
NTRA FAMILIAL DYSAUTONOMIA: NEGATIVE
NTRA FETAL FRACTION: NORMAL
NTRA FRAGILE X SYNDROME: NEGATIVE
NTRA GALACTOSEMIA: NEGATIVE
NTRA GAUCHER DISEASE: NEGATIVE
NTRA GENDER OF FETUS: NORMAL
NTRA MEDIUM CHAIN ACYL-COA DEHYDROGENASE DEFICIENCY: NEGATIVE
NTRA MONOSOMY X AGE-BASED RISK TEXT: NORMAL
NTRA MONOSOMY X RESULT TEXT: NORMAL
NTRA MONOSOMY X RISK SCORE TEXT: NORMAL
NTRA POLYCYSTIC KIDNEY DISEASE, AUTOSOMAL RECESSIVE: NEGATIVE
NTRA SMITH-LEMLI-OPITZ SYNDROME: NEGATIVE
NTRA SPINAL MUSCULAR ATROPHY: NEGATIVE
NTRA TAY-SACHS DISEASE: NEGATIVE
NTRA TRIPLOIDY RESULT TEXT: NORMAL
NTRA TRISOMY 13 AGE-BASED RISK TEXT: NORMAL
NTRA TRISOMY 13 RESULT TEXT: NORMAL
NTRA TRISOMY 13 RISK SCORE TEXT: NORMAL
NTRA TRISOMY 18 AGE-BASED RISK TEXT: NORMAL
NTRA TRISOMY 18 RESULT TEXT: NORMAL
NTRA TRISOMY 18 RISK SCORE TEXT: NORMAL
NTRA TRISOMY 21 AGE-BASED RISK TEXT: NORMAL
NTRA TRISOMY 21 RESULT TEXT: NORMAL
NTRA TRISOMY 21 RISK SCORE TEXT: NORMAL

## 2024-08-19 LAB

## 2024-08-21 ENCOUNTER — HOSPITAL ENCOUNTER (OUTPATIENT)
Dept: ULTRASOUND IMAGING | Facility: HOSPITAL | Age: 29
Discharge: HOME | End: 2024-08-21
Admitting: NURSE PRACTITIONER
Payer: COMMERCIAL

## 2024-08-21 DIAGNOSIS — O36.80X0 PREGNANCY WITH INCONCLUSIVE FETAL VIABILITY, SINGLE OR UNSPECIFIED FETUS: ICD-10-CM

## 2024-08-21 PROCEDURE — 76801 OB US < 14 WKS SINGLE FETUS: CPT

## 2024-09-04 ENCOUNTER — HOSPITAL ENCOUNTER (EMERGENCY)
Facility: HOSPITAL | Age: 29
Discharge: HOME | End: 2024-09-04
Attending: EMERGENCY MEDICINE
Payer: COMMERCIAL

## 2024-09-04 VITALS
BODY MASS INDEX: 27.67 KG/M2 | HEART RATE: 84 BPM | OXYGEN SATURATION: 98 % | WEIGHT: 150.35 LBS | HEIGHT: 62 IN | TEMPERATURE: 98 F | RESPIRATION RATE: 18 BRPM | DIASTOLIC BLOOD PRESSURE: 75 MMHG | SYSTOLIC BLOOD PRESSURE: 120 MMHG

## 2024-09-04 DIAGNOSIS — R51.9 ACUTE NONINTRACTABLE HEADACHE, UNSPECIFIED HEADACHE TYPE: Primary | ICD-10-CM

## 2024-09-04 PROCEDURE — 63710000001 DIPHENHYDRAMINE PER 50 MG

## 2024-09-04 PROCEDURE — 99283 EMERGENCY DEPT VISIT LOW MDM: CPT

## 2024-09-04 PROCEDURE — 63710000001 ONDANSETRON ODT 4 MG TABLET DISPERSIBLE

## 2024-09-04 RX ORDER — ONDANSETRON 4 MG/1
4 TABLET, ORALLY DISINTEGRATING ORAL EVERY 8 HOURS PRN
Qty: 15 TABLET | Refills: 0 | Status: SHIPPED | OUTPATIENT
Start: 2024-09-04 | End: 2024-09-09

## 2024-09-04 RX ORDER — DIPHENHYDRAMINE HCL 25 MG
25 CAPSULE ORAL ONCE
Status: COMPLETED | OUTPATIENT
Start: 2024-09-04 | End: 2024-09-04

## 2024-09-04 RX ORDER — ACETAMINOPHEN 500 MG
1000 TABLET ORAL ONCE
Status: COMPLETED | OUTPATIENT
Start: 2024-09-04 | End: 2024-09-04

## 2024-09-04 RX ORDER — ONDANSETRON 4 MG/1
4 TABLET, ORALLY DISINTEGRATING ORAL ONCE
Status: COMPLETED | OUTPATIENT
Start: 2024-09-04 | End: 2024-09-04

## 2024-09-04 RX ADMIN — ONDANSETRON 4 MG: 4 TABLET, ORALLY DISINTEGRATING ORAL at 15:47

## 2024-09-04 RX ADMIN — ACETAMINOPHEN 1000 MG: 500 TABLET ORAL at 15:48

## 2024-09-04 RX ADMIN — DIPHENHYDRAMINE HYDROCHLORIDE 25 MG: 25 CAPSULE ORAL at 15:48

## 2024-09-04 NOTE — Clinical Note
[facility] EMERGENCY ROOM  [address] [place] [address]  Phone: [phone]  Fax: [phone]    Jonathan Vanna accompanied Benita KEEN to the emergency department on 9/4/2024. They may return to work on 09/05/2024.        Thank you for choosing [facility].    Holly Hernandez, APRN

## 2024-09-04 NOTE — DISCHARGE INSTRUCTIONS
Home.  Increase your fluid intake.  You may take over-the-counter Tylenol or Benadryl with Zofran for your headache treatment.  Get plenty of rest and eat well-balanced meals.    Call your primary care provider to schedule appointment for follow-up as needed.    Keep your appointment as previously scheduled with your OB/GYN for normal prenatal follow-up.    If symptoms worsen, change in presentation, or you develop new symptoms such as high blood pressure, visual changes, or worsening headache please seek medical attention or return to the ED for further evaluation.

## 2024-09-04 NOTE — ED PROVIDER NOTES
Time: 2:55 PM EDT  Date of encounter:  9/4/2024  Independent Historian/Clinical History and Information was obtained by:   Patient    History is limited by: N/A    Chief Complaint   Patient presents with    Headache    Eye Pain         History of Present Illness:  Patient is a 29 y.o. year old female who presents to the emergency department for evaluation of headache.  Patient reports she started having sharp stabbing pain behind her left eye.  Episodes only last a few seconds and then resolved.  Patient has been having similar episodes for approximately 3 to 4 months.  She has been evaluated by her optometrist and found to have no abnormalities.  Denies injury, nausea, vomiting, visual changes.    Patient Care Team  Primary Care Provider: Cori Waldrop APRN    Past Medical History:     Allergies   Allergen Reactions    Haemophilus Influenzae Vaccines Unknown - High Severity    Influenza Virus Vaccine [Influenza Virus Vac Live Quad] Unknown - High Severity     Past Medical History:   Diagnosis Date    Cholelithiasis     Chronic hepatitis C complicating pregnancy, antepartum 05/05/2022 5/5/2022 Quantitative values qtrimester; referral to GI PP for treatment     Constipation, unspecified     Hepatitis C     ON MEDS- ON CURRENT PROBLEMS    [facility]     Hx of chlamydia infection     L4-L5 disc bulge 04/07/2015    Miscarriage     Feb. 2024    Other specified noninflammatory disorders of vagina     Unspecified viral hepatitis C without hepatic coma      Past Surgical History:   Procedure Laterality Date    CHOLECYSTECTOMY N/A 03/29/2023    Procedure: CHOLECYSTECTOMY LAPAROSCOPIC;  Surgeon: Lai Miller MD;  Location: [facility] OR;  Service: General;  Laterality: N/A;    EYE SURGERY Bilateral     x2    HERNIA REPAIR  2009    Umbilical, mesh    WISDOM TOOTH EXTRACTION       Family History   Problem Relation Age of Onset    Breast cancer Mother     Ovarian cancer Mother   "   Breast cancer Maternal Aunt     Thyroid disease Maternal Grandmother     Heart disease Maternal Grandmother     Diabetes Maternal Grandmother     Uterine cancer Neg Hx     Colon cancer Neg Hx     Melanoma Neg Hx     Clotting disorder Neg Hx     Alcohol abuse Neg Hx     Arthritis Neg Hx     Asthma Neg Hx     Birth defects Neg Hx     Cancer Neg Hx     COPD Neg Hx     Bleeding Disorder Neg Hx     Depression Neg Hx     Drug abuse Neg Hx     Early death Neg Hx     Hearing loss Neg Hx     Hyperlipidemia Neg Hx     Hypertension Neg Hx     Kidney disease Neg Hx     Learning disabilities Neg Hx     Malig Hyperthermia Neg Hx     Mental illness Neg Hx     Mental retardation Neg Hx     Miscarriages / Stillbirths Neg Hx        Home Medications:  Prior to Admission medications    Not on File        Social History:   Social History     Tobacco Use    Smoking status: Never     Passive exposure: Never    Smokeless tobacco: Never   Vaping Use    Vaping status: Every Day    Substances: Nicotine, Flavoring    Devices: Disposable   Substance Use Topics    Alcohol use: Never    Drug use: Never         Review of Systems:  Review of Systems   Constitutional: Negative.    Eyes: Negative.    Respiratory: Negative.     Cardiovascular: Negative.    Gastrointestinal: Negative.    Endocrine: Negative.    Genitourinary: Negative.    Musculoskeletal: Negative.    Skin: Negative.    Allergic/Immunologic: Negative.    Neurological:  Positive for headaches.   Hematological: Negative.    Psychiatric/Behavioral: Negative.          Physical Exam:  /75 (BP Location: Right arm, Patient Position: Lying)   Pulse 84   Temp 98 °F (36.7 °C) (Oral)   Resp 18   Ht 157.5 cm (62\")   Wt 68.2 kg (150 lb 5.7 oz)   LMP 05/20/2024   SpO2 98%   BMI 27.50 kg/m²         Physical Exam  Vitals and nursing note reviewed.   Constitutional:       General: She is not in acute distress.     Appearance: Normal appearance. She is not toxic-appearing.   HENT:      " Head: Normocephalic and atraumatic.      Jaw: There is normal jaw occlusion.      Right Ear: Tympanic membrane, ear canal and external ear normal.      Left Ear: Tympanic membrane, ear canal and external ear normal.      Nose: Nose normal.      Mouth/Throat:      Mouth: Mucous membranes are moist.      Pharynx: Oropharynx is clear.   Eyes:      General: Lids are normal.      Extraocular Movements: Extraocular movements intact.      Conjunctiva/sclera: Conjunctivae normal.      Pupils: Pupils are equal, round, and reactive to light.   Cardiovascular:      Rate and Rhythm: Normal rate and regular rhythm.      Pulses: Normal pulses.      Heart sounds: Normal heart sounds.   Pulmonary:      Effort: Pulmonary effort is normal. No respiratory distress.      Breath sounds: Normal breath sounds. No stridor. No wheezing, rhonchi or rales.   Chest:      Chest wall: No tenderness.   Abdominal:      General: Abdomen is flat. Bowel sounds are normal.      Palpations: Abdomen is soft.      Tenderness: There is no abdominal tenderness. There is no right CVA tenderness, left CVA tenderness, guarding or rebound.   Musculoskeletal:         General: Normal range of motion.      Cervical back: Normal range of motion and neck supple.      Right lower leg: No edema.      Left lower leg: No edema.   Skin:     General: Skin is warm and dry.   Neurological:      Mental Status: She is alert and oriented to person, place, and time. Mental status is at baseline.   Psychiatric:         Mood and Affect: Mood normal.                  Procedures:  Procedures      Medical Decision Making:      Comorbidities that affect care:    Constipation, hep C, cholelithiasis    External Notes reviewed:    Previous Clinic Note: Patient was last seen by MIKEL Desai for prenatal care.      The following orders were placed and all results were independently analyzed by me:  No orders of the defined types were placed in this encounter.      Medications  Given in the Emergency Department:  Medications   acetaminophen (TYLENOL) tablet 1,000 mg (1,000 mg Oral Given 9/4/24 1548)   diphenhydrAMINE (BENADRYL) capsule 25 mg (25 mg Oral Given 9/4/24 1548)   ondansetron ODT (ZOFRAN-ODT) disintegrating tablet 4 mg (4 mg Oral Given 9/4/24 1547)        ED Course:    The patient was initially evaluated in the triage area where orders were placed. The patient was later dispositioned by MIKEL Zelaya.      The patient was advised to stay for completion of workup which includes but is not limited to communication of labs and radiological results, reassessment and plan. The patient was advised that leaving prior to disposition by a provider could result in critical findings that are not communicated to the patient.          Labs:    Lab Results (last 24 hours)       ** No results found for the last 24 hours. **             Imaging:    No Radiology Exams Resulted Within Past 24 Hours      Differential Diagnosis and Discussion:      Headache: Differential diagnosis includes but is not limited to migraine, cluster headache, hypertension, tumor, subarachnoid bleeding, pseudotumor cerebri, temporal arteritis, infections, tension headache, and TMJ syndrome.        MDM  Number of Diagnoses or Management Options  Acute nonintractable headache, unspecified headache type  Diagnosis management comments: The patient presented to the emergency department with a headache. The patient is now resting comfortably in feels better, is alert, talkative, interactive and in no distress after ED treatment. The patient appears well and is able to tolerate PO fluids. Repeat examination is unremarkable and benign. The patient is neurologically intact, has a normal mental status, and this ambulatory in the ED. The history, exam, diagnostic testing (if any) and the patient's current condition do not suggest meningitis, stroke, sepsis, subarachnoid hemorrhage, intracranial bleeding, encephalitis,  temporal arteritis or other significant pathology to warrant further testing, continued ED treatment, admission, neurological consultation, for other specialist evaluation at this point. The vital signs have been stable. The patient's condition is stable and appropriate for discharge. The patient will pursue further outpatient evaluation with the primary care physician or other designated or consulting physician as indicated in the discharge instructions.  Was encouraged to follow-up with her OB/GYN this week for further suggestions and treatment of her headache.  She was also encouraged to return to the ED for further evaluation if symptoms persist.        Patient Care Considerations:    SEPSIS was considered but is NOT present in the emergency department as SIRS criteria is not present. ANTIBIOTICS: I considered prescribing antibiotics as an outpatient however no bacterial focus of infection was found.      Consultants/Shared Management Plan:    None    Social Determinants of Health:    Patient is independent, reliable, and has access to care.       Disposition and Care Coordination:    Discharged: The patient is suitable and stable for discharge with no need for consideration of admission.    I have explained the patient´s condition, diagnoses and treatment plan based on the information available to me at this time. I have answered questions and addressed any concerns. The patient has a good  understanding of the patient´s diagnosis, condition, and treatment plan as can be expected at this point. The vital signs have been stable. The patient´s condition is stable and appropriate for discharge from the emergency department.      The patient will pursue further outpatient evaluation with the primary care physician or other designated or consulting physician as outlined in the discharge instructions. They are agreeable to this plan of care and follow-up instructions have been explained in detail. The patient has  received these instructions in written format and has expressed an understanding of the discharge instructions. The patient is aware that any significant change in condition or worsening of symptoms should prompt an immediate return to this or the closest emergency department or call to 1.  I have explained discharge medications and the need for follow up with the patient/caretakers. This was also printed in the discharge instructions. Patient was discharged with the following medications and follow up:      Medication List        New Prescriptions      ondansetron ODT 4 MG disintegrating tablet  Commonly known as: ZOFRAN-ODT  Take 1 tablet by mouth Every 8 (Eight) Hours As Needed for Nausea or Vomiting for up to 5 days.               Where to Get Your Medications        These medications were sent to [id number] - [place], [place] - [address] AT [facility] OF [address] [facility] & [place] - [phone]  - [phone]   [address] [place] [address]      Phone: [phone]   ondansetron ODT 4 MG disintegrating tablet      Cori Waldrop, APRN  [address]  [address]  [place] [address]  [phone]    Call   As needed    OB/GYN    Call   for follow-up       Final diagnoses:   Acute nonintractable headache, unspecified headache type        ED Disposition       ED Disposition   Discharge    Condition   Stable    Comment   --               This medical record created using voice recognition software.             Holly Hernandez, MIKEL  09/04/24 [id number]

## 2024-09-10 ENCOUNTER — REFERRAL TRIAGE (OUTPATIENT)
Dept: LABOR AND DELIVERY | Facility: HOSPITAL | Age: 29
End: 2024-09-10
Payer: COMMERCIAL

## 2024-09-10 PROBLEM — K80.20 CHOLELITHIASIS: Status: RESOLVED | Noted: 2023-03-22 | Resolved: 2024-09-10

## 2024-09-10 NOTE — PROGRESS NOTES
Routine Prenatal Visit     Deysi KEEN is a 29 y.o.  at 14w6d here for her routine OB visit.   She is taking her prenatal vitamins.Reports no loss of fluid or vaginal bleeding. Patient doing well without any complaints. Pregnancy is complicated by:     Patient Active Problem List   Diagnosis    Anxiety    Supervision of other normal pregnancy, antepartum    Chronic hepatitis C complicating pregnancy, antepartum    Rubella non-immune status, antepartum         OB History    Para Term  AB Living   6 4 4 0 1 4   SAB IAB Ectopic Molar Multiple Live Births   1       0 4      # Outcome Date GA Lbr Isai/2nd Weight Sex Type Anes PTL Lv   6 Current            5 SAB 2024 8w0d    SAB      4 Term 22 39w1d 8768:56 / 00:14 3035 g (6 lb 11.1 oz) M Vag-Spont EPI N JOSÉ MIGUEL   3 Term 19 38w6d  2863 g (6 lb 5 oz) F Vag-Spont   JOSÉ MIGUEL      Complications: Postpartum Hemorrhage   2 Term 10/25/18 41w0d  3033 g (6 lb 11 oz) M Vag-Spont      1 Term 17 38w4d  3118 g (6 lb 14 oz) F Vag-Spont   JOSÉ MIGUEL      Birth Comments: No prenatal care      Obstetric Comments   MENARCHE AT 12 REGULAR 7 DAYS           ROS:   General ROS: negative for - chills or fatigue  Respiratory ROS: negative for - cough or hemoptysis  Cardiovascular ROS: negative for - chest pain or dyspnea on exertion  Genito-Urinary ROS: negative for  change in urinary stream, vaginal discharge   Musculoskeletal ROS: negative for - gait disturbance or joint pain  Dermatological ROS: negative for acne,  dry skin or itching    Objective  Physical Exam:   Vitals:    24 1432   BP: 120/78       FHT: 110-160 BPM      General appearance - alert, well appearing, and in no distress  Mental status - alert, oriented to person, place, and time  Abdomen- Soft, Gravid uterus, non-tender to palpation  Musculoskeletal: negative for - gait disturbance or joint pain  Extremeties: negative swelling or cyanosis   Dermatological: negative  rashes or skin lesions       Assessment/Plan:   Diagnoses and all orders for this visit:    1. Supervision of other normal pregnancy, antepartum (Primary)  -     POC Urinalysis Dipstick    2. Chronic hepatitis C complicating pregnancy, antepartum          Counseling:   Second trimester precautions  Round Ligament Pain:  The uterus has several ligaments which provide support and keep the uterus in place. As the  uterus grows these ligaments are pulled and stretched which often causes sharp stabbing like pain in the inguinal area.   You may find a pregnancy support band helpful. Changing positions may also help. Yoga is a great way to cope with round ligament and low back pain in pregnancy.    Massage may also help with low back pain   Things to Consider at this Point in your Pregnancy:  Some women experience swelling in their feet during pregnancy. Compression stockings may help  Drink plenty of water and stay active   Make sure you are eating frequent small meals, nuts are a wonderful snack to keep with you            Return in about 4 weeks (around 10/9/2024) for Routine OB visit.      We have gone over prenatal care to include the timing and content of visits. I informed her how to contact the office and/or on call person in the event of any problems and encouraged her to do so when she feels it is necessary.  We then spent time answering her questions which she indicated were answered to her satisfaction.    Prabha Carson DO  9/11/2024 15:07 EDT

## 2024-09-11 ENCOUNTER — ROUTINE PRENATAL (OUTPATIENT)
Dept: OBSTETRICS AND GYNECOLOGY | Facility: CLINIC | Age: 29
End: 2024-09-11
Payer: COMMERCIAL

## 2024-09-11 ENCOUNTER — PATIENT OUTREACH (OUTPATIENT)
Dept: LABOR AND DELIVERY | Facility: HOSPITAL | Age: 29
End: 2024-09-11
Payer: COMMERCIAL

## 2024-09-11 VITALS — SYSTOLIC BLOOD PRESSURE: 120 MMHG | DIASTOLIC BLOOD PRESSURE: 78 MMHG | BODY MASS INDEX: 27.8 KG/M2 | WEIGHT: 152 LBS

## 2024-09-11 DIAGNOSIS — B18.2 CHRONIC HEPATITIS C COMPLICATING PREGNANCY, ANTEPARTUM: ICD-10-CM

## 2024-09-11 DIAGNOSIS — O98.419 CHRONIC HEPATITIS C COMPLICATING PREGNANCY, ANTEPARTUM: ICD-10-CM

## 2024-09-11 DIAGNOSIS — Z34.80 SUPERVISION OF OTHER NORMAL PREGNANCY, ANTEPARTUM: Primary | ICD-10-CM

## 2024-09-11 LAB
GLUCOSE UR STRIP-MCNC: NEGATIVE MG/DL
PROT UR STRIP-MCNC: NEGATIVE MG/DL

## 2024-09-11 PROCEDURE — 87522 HEPATITIS C REVRS TRNSCRPJ: CPT | Performed by: NURSE PRACTITIONER

## 2024-09-11 RX ORDER — PRENATAL VIT/IRON FUM/FOLIC AC 27MG-0.8MG
TABLET ORAL DAILY
COMMUNITY

## 2024-09-11 NOTE — PATIENT INSTRUCTIONS
Venipuncture Blood Specimen Collection  Venipuncture performed in left arm by Nelia Antunez with good hemostasis. Patient tolerated the procedure well without complications.   09/11/24   Nelia Antunez

## 2024-09-11 NOTE — OUTREACH NOTE
[facility]  Enrollment    Current Estimated Gestational Age: 14w6d    Questions/Answers      Flowsheet Row Responses   Would like to participate? Yes          Intake Assessment      Flowsheet Row Responses   Best Method for Contacting Cell   Currently Employed Yes   Able to keep appointments as scheduled Yes   Gender(s) and Name(s) Girl   Resources Presently Utilizing: WIC (Women, Infant, Children)   Maternal Warning Signs Provided   Other: Provided   Other Education WIC Benefits, Insurance benefits/Incentives, HANDS            Learning Assessment      Flowsheet Row Responses   Relationship Patient, Significant Other   Does the learner have any barriers to learning? No Barriers   What is the preferred language of the learner for medical teaching? English   Is an  required? No              Tobacco, Alcohol, and Drug History     reports that she has never smoked. She has never been exposed to tobacco smoke. She has never used smokeless tobacco.   reports no history of alcohol use.   reports no history of drug use.    Ana Saavedra RN  Maternity Nurse Navigator    9/11/2024, 15:06 EDT

## 2024-09-13 LAB
HCV RNA SERPL NAA+PROBE-ACNC: NORMAL IU/ML
TEST INFORMATION: NORMAL

## 2024-10-10 ENCOUNTER — ROUTINE PRENATAL (OUTPATIENT)
Dept: OBSTETRICS AND GYNECOLOGY | Facility: CLINIC | Age: 29
End: 2024-10-10
Payer: COMMERCIAL

## 2024-10-10 VITALS — SYSTOLIC BLOOD PRESSURE: 114 MMHG | DIASTOLIC BLOOD PRESSURE: 80 MMHG | BODY MASS INDEX: 27.8 KG/M2 | WEIGHT: 152 LBS

## 2024-10-10 DIAGNOSIS — Z34.80 SUPERVISION OF OTHER NORMAL PREGNANCY, ANTEPARTUM: Primary | ICD-10-CM

## 2024-10-10 DIAGNOSIS — B18.2 CHRONIC HEPATITIS C COMPLICATING PREGNANCY, ANTEPARTUM: ICD-10-CM

## 2024-10-10 DIAGNOSIS — O98.419 CHRONIC HEPATITIS C COMPLICATING PREGNANCY, ANTEPARTUM: ICD-10-CM

## 2024-10-10 LAB
GLUCOSE UR STRIP-MCNC: NEGATIVE MG/DL
PROT UR STRIP-MCNC: NEGATIVE MG/DL

## 2024-10-10 NOTE — PROGRESS NOTES
Routine Prenatal Visit     Deysi KEEN is a 29 y.o.  at 19w0d here for her routine OB visit.   She is taking her prenatal vitamins.Reports no loss of fluid or vaginal bleeding. Patient doing well without any complaints. Pregnancy is complicated by:     Patient Active Problem List   Diagnosis    Anxiety    Supervision of other normal pregnancy, antepartum    Chronic hepatitis C complicating pregnancy, antepartum    Rubella non-immune status, antepartum         OB History    Para Term  AB Living   6 4 4 0 1 4   SAB IAB Ectopic Molar Multiple Live Births   1       0 4      # Outcome Date GA Lbr Isai/2nd Weight Sex Type Anes PTL Lv   6 Current            5 SAB 2024 8w0d    SAB      4 Term 22 39w1d 8768:56 / 00:14 3035 g (6 lb 11.1 oz) M Vag-Spont EPI N JOSÉ MIGUEL   3 Term 19 38w6d  2863 g (6 lb 5 oz) F Vag-Spont   JOSÉ MIGUEL      Complications: Postpartum Hemorrhage   2 Term 10/25/18 41w0d  3033 g (6 lb 11 oz) M Vag-Spont      1 Term 17 38w4d  3118 g (6 lb 14 oz) F Vag-Spont   JOSÉ MIGUEL      Birth Comments: No prenatal care      Obstetric Comments   MENARCHE AT 12 REGULAR 7 DAYS           ROS:   General ROS: negative for - chills or fatigue  Respiratory ROS: negative for - cough or hemoptysis  Cardiovascular ROS: negative for - chest pain or dyspnea on exertion  Genito-Urinary ROS: negative for  change in urinary stream, vaginal discharge   Musculoskeletal ROS: negative for - gait disturbance or joint pain  Dermatological ROS: negative for acne,  dry skin or itching    Objective  Physical Exam:   Vitals:    10/10/24 1406   BP: 114/80       Uterine Size: size equals dates  FHT: 110-160 BPM    General appearance - alert, well appearing, and in no distress  Mental status - alert, oriented to person, place, and time  Abdomen- Soft, Gravid uterus, non-tender to palpation  Musculoskeletal: negative for - gait disturbance or joint pain  Extremeties: negative swelling or cyanosis    Dermatological: negative rashes or skin lesions       Assessment/Plan:   Diagnoses and all orders for this visit:    1. Supervision of other normal pregnancy, antepartum (Primary)  -     POC Urinalysis Dipstick  -     US Ob 14 + Weeks Single or First Gestation; Future    2. Chronic hepatitis C complicating pregnancy, antepartum          Counseling:   Second trimester precautions  OB precautions, leaking, VB, jessica avalos vs PTL/Labor  HTN precautions reviewed: HA, vision change, RUQ/epigastric pain, edema  Round Ligament Pain:  The uterus has several ligaments which provide support and keep the uterus in place. As the  uterus grows these ligaments are pulled and stretched which often causes sharp stabbing like pain in the inguinal area.   You may find a pregnancy support band helpful. Changing positions may also help. Yoga is a great way to cope with round ligament and low back pain in pregnancy.    Massage may also help with low back pain   Things to Consider at this Point in your Pregnancy:  Some women experience swelling in their feet during pregnancy. Compression stockings may help  Drink plenty of water and stay active   Make sure you are eating frequent small meals, nuts are a wonderful snack to keep with you            Return in about 4 weeks (around 11/7/2024) for Routine OB visit.      We have gone over prenatal care to include the timing and content of visits. I informed her how to contact the office and/or on call person in the event of any problems and encouraged her to do so when she feels it is necessary.  We then spent time answering her questions which she indicated were answered to her satisfaction.    Prabha Carson DO  10/10/2024 14:15 EDT

## 2024-11-15 NOTE — PROGRESS NOTES
Routine Prenatal Visit     Deysi KEEN is a 29 y.o.  at 24w6d here for her routine OB visit.   She is taking her prenatal vitamins.Reports no loss of fluid or vaginal bleeding. Patient doing well. Had vomiting after glucose drink today.     Pregnancy is complicated by:     Patient Active Problem List   Diagnosis    Anxiety    Supervision of other normal pregnancy, antepartum    Chronic hepatitis C complicating pregnancy, antepartum    Rubella non-immune status, antepartum         OB History    Para Term  AB Living   6 4 4 0 1 4   SAB IAB Ectopic Molar Multiple Live Births   1       0 4      # Outcome Date GA Lbr Isai/2nd Weight Sex Type Anes PTL Lv   6 Current            5 SAB 2024 8w0d    SAB      4 Term 22 39w1d 8768:56 / 00:14 3035 g (6 lb 11.1 oz) M Vag-Spont EPI N JOSÉ MIGUEL   3 Term 19 38w6d  2863 g (6 lb 5 oz) F Vag-Spont   JOSÉ MIGUEL      Complications: Postpartum Hemorrhage   2 Term 10/25/18 41w0d  3033 g (6 lb 11 oz) M Vag-Spont      1 Term 17 38w4d  3118 g (6 lb 14 oz) F Vag-Spont   JOSÉ MIGUEL      Birth Comments: No prenatal care      Obstetric Comments   MENARCHE AT 12 REGULAR 7 DAYS           ROS:   General ROS: negative for - chills or fatigue  Genito-Urinary ROS: negative for  change in urinary stream, vaginal discharge     Objective  Physical Exam:   Vitals:    24 0845   BP: 137/80       Uterine Size: size equals dates  FHT: 110-160 BPM    General appearance - alert, well appearing, and in no distress  Abdomen- Soft, Gravid uterus, non-tender to palpation  Extremeties: negative swelling       Assessment/Plan:   Diagnoses and all orders for this visit:    1. Supervision of other normal pregnancy, antepartum (Primary)  Assessment & Plan:  ELVIS finalized: 3/6/25 per 11-week US and ACOG    Optional testing NIPS,CF/SMA,AFP:  Carrier screen negative, NIPS negative    COVID: Fully vaccinated  Tdap:  RSV:  Flu:    Rhogam:  1hr Glucola:  FU TPA w  glucola:  ? Desires Sterilization:    Anatomy US: 10/10 Growth 66%, AC 64%, consistent with dating  FHTS 139 Posterior placenta-grade 1 3VC with normal insertion Breech Girl   FU US:    PROBLEM LIST/PLAN:   Chronic Hep C - RNA quant negative    Rubella non-immune - plan vaccine after delivery        Orders:  -     POC Urinalysis Dipstick  -     Gestational Diabetes Screen 1 Hour; Future  -     CBC (No Diff); Future  -     RPR, Rfx Qn RPR / Confirm TP    2. Chronic hepatitis C complicating pregnancy, antepartum    3. Rubella non-immune status, antepartum    4. Anxiety      Lab appt made for repeat 1hr gtt, due to vomiting today.   Tdap rx given      Counseling:   Second trimester precautions  Round Ligament Pain:  The uterus has several ligaments which provide support and keep the uterus in place. As the  uterus grows these ligaments are pulled and stretched which often causes sharp stabbing like pain in the inguinal area.   You may find a pregnancy support band helpful. Changing positions may also help. Yoga is a great way to cope with round ligament and low back pain in pregnancy.    Massage may also help with low back pain   Things to Consider at this Point in your Pregnancy:  Some women experience swelling in their feet during pregnancy. Compression stockings may help  Drink plenty of water and stay active   Make sure you are eating frequent small meals, nuts are a wonderful snack to keep with you            Return in about 4 weeks (around 12/18/2024) for Routine OB visit.      We have gone over prenatal care to include the timing and content of visits. I informed her how to contact the office and/or on call person in the event of any problems and encouraged her to do so when she feels it is necessary.  We then spent time answering her questions which she indicated were answered to her satisfaction.    Cynthia Dasilva,   11/20/2024 09:22 EST

## 2024-11-20 ENCOUNTER — ROUTINE PRENATAL (OUTPATIENT)
Dept: OBSTETRICS AND GYNECOLOGY | Facility: CLINIC | Age: 29
End: 2024-11-20
Payer: COMMERCIAL

## 2024-11-20 VITALS — WEIGHT: 160 LBS | SYSTOLIC BLOOD PRESSURE: 137 MMHG | DIASTOLIC BLOOD PRESSURE: 80 MMHG | BODY MASS INDEX: 29.26 KG/M2

## 2024-11-20 DIAGNOSIS — F41.9 ANXIETY: ICD-10-CM

## 2024-11-20 DIAGNOSIS — O09.899 RUBELLA NON-IMMUNE STATUS, ANTEPARTUM: ICD-10-CM

## 2024-11-20 DIAGNOSIS — Z28.39 RUBELLA NON-IMMUNE STATUS, ANTEPARTUM: ICD-10-CM

## 2024-11-20 DIAGNOSIS — O98.419 CHRONIC HEPATITIS C COMPLICATING PREGNANCY, ANTEPARTUM: ICD-10-CM

## 2024-11-20 DIAGNOSIS — B18.2 CHRONIC HEPATITIS C COMPLICATING PREGNANCY, ANTEPARTUM: ICD-10-CM

## 2024-11-20 DIAGNOSIS — Z34.80 SUPERVISION OF OTHER NORMAL PREGNANCY, ANTEPARTUM: Primary | ICD-10-CM

## 2024-11-20 LAB
GLUCOSE UR STRIP-MCNC: NEGATIVE MG/DL
PROT UR STRIP-MCNC: NEGATIVE MG/DL

## 2024-11-20 NOTE — ASSESSMENT & PLAN NOTE
ELVIS finalized: 3/6/25 per 11-week US and ACOG    Optional testing NIPS,CF/SMA,AFP:  Carrier screen negative, NIPS negative    COVID: Fully vaccinated  Tdap:  RSV:  Flu:    Rhogam:  1hr Glucola:  FU TPA w glucola:  ? Desires Sterilization:    Anatomy US: 10/10 Growth 66%, AC 64%, consistent with dating  FHTS 139 Posterior placenta-grade 1 3VC with normal insertion Breech Girl   FU US:    PROBLEM LIST/PLAN:   Chronic Hep C - RNA quant negative    Rubella non-immune - plan vaccine after delivery

## 2024-12-04 ENCOUNTER — LAB (OUTPATIENT)
Dept: OBSTETRICS AND GYNECOLOGY | Facility: CLINIC | Age: 29
End: 2024-12-04
Payer: COMMERCIAL

## 2024-12-04 DIAGNOSIS — Z34.80 SUPERVISION OF OTHER NORMAL PREGNANCY, ANTEPARTUM: ICD-10-CM

## 2024-12-04 LAB
DEPRECATED RDW RBC AUTO: 39.8 FL (ref 37–54)
ERYTHROCYTE [DISTWIDTH] IN BLOOD BY AUTOMATED COUNT: 12.4 % (ref 12.3–15.4)
GLUCOSE 1H P GLC SERPL-MCNC: 124 MG/DL (ref 65–139)
HCT VFR BLD AUTO: 32.1 % (ref 34–46.6)
HGB BLD-MCNC: 10.6 G/DL (ref 12–15.9)
MCH RBC QN AUTO: 29.2 PG (ref 26.6–33)
MCHC RBC AUTO-ENTMCNC: 33 G/DL (ref 31.5–35.7)
MCV RBC AUTO: 88.4 FL (ref 79–97)
PLATELET # BLD AUTO: 257 10*3/MM3 (ref 140–450)
PMV BLD AUTO: 9.6 FL (ref 6–12)
RBC # BLD AUTO: 3.63 10*6/MM3 (ref 3.77–5.28)
WBC NRBC COR # BLD AUTO: 6.86 10*3/MM3 (ref 3.4–10.8)

## 2024-12-04 PROCEDURE — 85027 COMPLETE CBC AUTOMATED: CPT | Performed by: STUDENT IN AN ORGANIZED HEALTH CARE EDUCATION/TRAINING PROGRAM

## 2024-12-04 PROCEDURE — 86592 SYPHILIS TEST NON-TREP QUAL: CPT | Performed by: STUDENT IN AN ORGANIZED HEALTH CARE EDUCATION/TRAINING PROGRAM

## 2024-12-04 PROCEDURE — 82950 GLUCOSE TEST: CPT | Performed by: STUDENT IN AN ORGANIZED HEALTH CARE EDUCATION/TRAINING PROGRAM

## 2024-12-05 RX ORDER — FERROUS SULFATE 325(65) MG
325 TABLET ORAL EVERY OTHER DAY
Qty: 30 TABLET | Refills: 1 | Status: SHIPPED | OUTPATIENT
Start: 2024-12-05

## 2024-12-06 LAB — RPR SER QL: NON REACTIVE

## 2024-12-11 RX ORDER — FERROUS SULFATE 325(65) MG
325 TABLET ORAL EVERY OTHER DAY
Qty: 30 TABLET | Refills: 1 | Status: CANCELLED | OUTPATIENT
Start: 2024-12-11

## 2024-12-11 RX ORDER — PRENATAL VIT/IRON FUM/FOLIC AC 27MG-0.8MG
1 TABLET ORAL DAILY
Qty: 30 TABLET | Refills: 0 | Status: SHIPPED | OUTPATIENT
Start: 2024-12-11

## 2024-12-11 NOTE — TELEPHONE ENCOUNTER
Pateint requesting refill on Ferrous Sulfate.  Called her & informed her Rx was sent 12/5/24 Ferrous Sulfate # 30 with 1.  Informed patient to contact pharmacy for refills

## 2024-12-13 ENCOUNTER — PATIENT OUTREACH (OUTPATIENT)
Dept: LABOR AND DELIVERY | Facility: HOSPITAL | Age: 29
End: 2024-12-13
Payer: COMMERCIAL

## 2024-12-13 NOTE — OUTREACH NOTE
[facility]  Unable to Reach    Questions/Answers      Flowsheet Row Responses   Pending Outreach Prenatal Check-in   Call Attempt First   Outcome No answer/busy, Left message   Next Call Attempt Date 12/16/24              Ana Saavedra RN  Maternity Nurse Navigator    12/13/2024, 10:03 EST

## 2024-12-16 ENCOUNTER — PATIENT OUTREACH (OUTPATIENT)
Dept: LABOR AND DELIVERY | Facility: HOSPITAL | Age: 29
End: 2024-12-16
Payer: COMMERCIAL

## 2024-12-16 NOTE — OUTREACH NOTE
[facility]  Unable to Reach    Questions/Answers      Flowsheet Row Responses   Pending Outreach Prenatal Check-in   Call Attempt Second   Outcome No answer/busy, Left message   Next Call Attempt Date 12/18/24              Ana Saavedra RN  Maternity Nurse Navigator    12/16/2024, 14:13 EST

## 2024-12-23 ENCOUNTER — TELEPHONE (OUTPATIENT)
Dept: OBSTETRICS AND GYNECOLOGY | Facility: CLINIC | Age: 29
End: 2024-12-23
Payer: COMMERCIAL

## 2024-12-31 NOTE — TELEPHONE ENCOUNTER
Called patient stated transmission went out on her car.  Would not r/s she sts will keep next appointment.  Informed her to check with [employer] they could possible get her transportation..

## 2025-01-20 ENCOUNTER — TELEPHONE (OUTPATIENT)
Dept: OBSTETRICS AND GYNECOLOGY | Facility: CLINIC | Age: 30
End: 2025-01-20
Payer: COMMERCIAL

## 2025-01-22 NOTE — TELEPHONE ENCOUNTER
Called patient no answer.  Calling patient to inform her of her r/s appointment.  1/28/25 @ 10:45 with Dr Dasilva.  ADIEL FOR [facility]UB TO RELAY

## 2025-01-27 NOTE — PROGRESS NOTES
Routine Prenatal Visit     Deysi KEEN is a 29 y.o.  at 34w5d here for her routine OB visit.   She is taking her prenatal vitamins.Reports no loss of fluid or vaginal bleeding. Patient doing well. Admits to some pelvic pain.    Pregnancy complicated by:     Patient Active Problem List   Diagnosis    Anxiety    Supervision of other normal pregnancy, antepartum    Chronic hepatitis C complicating pregnancy, antepartum    Rubella non-immune status, antepartum    Limited prenatal care, antepartum         OB History    Para Term  AB Living   6 4 4 0 1 4   SAB IAB Ectopic Molar Multiple Live Births   1       0 4      # Outcome Date GA Lbr Isai/2nd Weight Sex Type Anes PTL Lv   6 Current            5 SAB 2024 8w0d    SAB      4 Term 22 39w1d 8768:56 / 00:14 3035 g (6 lb 11.1 oz) M Vag-Spont EPI N JOSÉ MIGUEL   3 Term 19 38w6d  2863 g (6 lb 5 oz) F Vag-Spont   JOSÉ MIGUEL      Complications: Postpartum Hemorrhage   2 Term 10/25/18 41w0d  3033 g (6 lb 11 oz) M Vag-Spont      1 Term 17 38w4d  3118 g (6 lb 14 oz) F Vag-Spont   JOSÉ MIGUEL      Birth Comments: No prenatal care      Obstetric Comments   MENARCHE AT 12 REGULAR 7 DAYS           ROS:  General ROS: negative for - chills or fatigue  Genito-Urinary ROS: negative for  change in urinary stream, vaginal discharge     Objective  Physical Exam:   Vitals:    25 1054   BP: 104/73       Uterine Size: size equals dates  FHT: 110-160 BPM         Assessment/Plan:   Diagnoses and all orders for this visit:    1. Supervision of other normal pregnancy, antepartum (Primary)  Assessment & Plan:  ELVIS finalized: 3/6/25 per 11-week US and ACOG    Optional testing NIPS,CF/SMA,AFP:  Carrier screen negative, NIPS negative    COVID: Fully vaccinated  Tdap:  RSV:  Flu:    Rhogam:  1hr Glucola:  FU TPA w glucola:  ? Desires Sterilization: tubal papers signed    Anatomy US: 10/10 Growth 66%, AC 64%, consistent with dating  FHTS 139 Posterior  placenta-grade 1 3VC with normal insertion Breech Girl   FU US:    PROBLEM LIST/PLAN:   Chronic Hep C - RNA quant negative    Rubella non-immune - plan vaccine after delivery      Limited prenatal care    Orders:  -     POC Urinalysis Dipstick  -     US Ob 14 + Weeks Single or First Gestation; Future    2. Limited prenatal care, antepartum            Counseling:   OB precautions, leaking, VB, jessica avalos vs PTL/Labor  FKC  Round Ligament Pain:  The uterus has several ligaments which provide support and keep the uterus in place. As the  uterus grows these ligaments are pulled and stretched which often causes sharp stabbing like pain in the inguinal area.   You may find a pregnancy support band helpful. Changing positions may also help. Yoga is a great way to cope with round ligament and low back pain in pregnancy.    Massage may also help with low back pain   Things to Consider at this Point in your Pregnancy:  Some women experience swelling in their feet during pregnancy. Compression stockings may help  Drink plenty of water and stay active   Make sure you are eating frequent small meals, nuts are a wonderful snack to keep with you            Return in about 2 weeks (around 2/11/2025) for Routine OB visit.      We have gone over prenatal care to include the timing and content of visits. I informed her how to contact the office and/or on call person in the event of any problems and encouraged her to do so when she feels it is necessary.  We then spent time answering her questions which she indicated were answered to her satisfaction.    Cynthia Dasilva, DO  1/28/2025 13:11 EST

## 2025-01-28 ENCOUNTER — ROUTINE PRENATAL (OUTPATIENT)
Dept: OBSTETRICS AND GYNECOLOGY | Facility: CLINIC | Age: 30
End: 2025-01-28
Payer: COMMERCIAL

## 2025-01-28 ENCOUNTER — PATIENT OUTREACH (OUTPATIENT)
Dept: LABOR AND DELIVERY | Facility: HOSPITAL | Age: 30
End: 2025-01-28
Payer: COMMERCIAL

## 2025-01-28 VITALS — BODY MASS INDEX: 30.18 KG/M2 | SYSTOLIC BLOOD PRESSURE: 104 MMHG | WEIGHT: 165 LBS | DIASTOLIC BLOOD PRESSURE: 73 MMHG

## 2025-01-28 DIAGNOSIS — O09.30 LIMITED PRENATAL CARE, ANTEPARTUM: ICD-10-CM

## 2025-01-28 DIAGNOSIS — Z34.80 SUPERVISION OF OTHER NORMAL PREGNANCY, ANTEPARTUM: Primary | ICD-10-CM

## 2025-01-28 LAB
GLUCOSE UR STRIP-MCNC: NEGATIVE MG/DL
PROT UR STRIP-MCNC: NEGATIVE MG/DL

## 2025-01-28 NOTE — OUTREACH NOTE
[facility]  Check-In    Current Estimated Gestational Age: 34w5d      Questions/Answers      Flowsheet Row Responses   Best Method for Contacting Cell   Demographics Reviewed Yes   Able to keep appointments as scheduled No  [due to transportation]   Baby Active/Feeling Fetal Movemen Yes   How are you presently feeling? Good   Resource/Environmental Concerns Reliable Transportation   Do you have any questions related to your care experience, your pregnancy, plans for delivery, any concerns, etc? No   Other Education Transportation Assistance            Information given via handout for transportation through [employer].     Ana Saavedra RN  Maternity Nurse Navigator    1/28/2025, 11:05 EST

## 2025-01-28 NOTE — ASSESSMENT & PLAN NOTE
ELVIS finalized: 3/6/25 per 11-week US and ACOG    Optional testing NIPS,CF/SMA,AFP:  Carrier screen negative, NIPS negative    COVID: Fully vaccinated  Tdap:  RSV:  Flu:    Rhogam:  1hr Glucola:  FU TPA w glucola:  ? Desires Sterilization: tubal papers signed    Anatomy US: 10/10 Growth 66%, AC 64%, consistent with dating  FHTS 139 Posterior placenta-grade 1 3VC with normal insertion Breech Girl   FU US:    PROBLEM LIST/PLAN:   Chronic Hep C - RNA quant negative    Rubella non-immune - plan vaccine after delivery      Limited prenatal care

## 2025-01-29 ENCOUNTER — TELEPHONE (OUTPATIENT)
Dept: OBSTETRICS AND GYNECOLOGY | Facility: CLINIC | Age: 30
End: 2025-01-29
Payer: COMMERCIAL

## 2025-01-29 NOTE — TELEPHONE ENCOUNTER
"    Caller: Benita KEEN \"Lalita\"  Female, 29 y.o., 1995  MRN: [id number]  CSN: [id number]  Phone: [phone]    Relationship to patient: SELF            Type of visit: OB FOLLOW UP APPT    Requested date: MORNING APPT     I    Additional notes:PT STATES SHE WAS WAITING FOR THE PRACTICE TO [place] FOLLOW UP OB APPT, PT WOULD LIKE A CALL BACK TO [facility], NOTHING AMBERLY WITHIN NEXT [facility] TIMEFRAME ON HUB END       Pregnant: (34w6d, 3/6/25)   "

## 2025-01-29 NOTE — TELEPHONE ENCOUNTER
Called patient left message follow up appointment would be scheduled and we will call her back or [facility]

## 2025-02-19 PROBLEM — O99.013 ANEMIA DURING PREGNANCY IN THIRD TRIMESTER: Status: ACTIVE | Noted: 2025-02-19

## 2025-02-19 NOTE — PROGRESS NOTES
Routine Prenatal Visit     Deysi KEEN is a 29 y.o.  at 37w6d here for her routine OB visit.   She is taking her prenatal vitamins.Reports no loss of fluid or vaginal bleeding. Patient doing well without any complaints. Pregnancy complicated by:     Patient Active Problem List   Diagnosis    Anxiety    Supervision of other normal pregnancy, antepartum    Chronic hepatitis C complicating pregnancy, antepartum    Rubella non-immune status, antepartum    Limited prenatal care, antepartum    Anemia during pregnancy in third trimester         OB History    Para Term  AB Living   6 4 4 0 1 4   SAB IAB Ectopic Molar Multiple Live Births   1       0 4      # Outcome Date GA Lbr Isai/2nd Weight Sex Type Anes PTL Lv   6 Current            5 SAB 2024 8w0d    SAB      4 Term 22 39w1d 8768:56 / 00:14 3035 g (6 lb 11.1 oz) M Vag-Spont EPI N JOSÉ MIGUEL   3 Term 19 38w6d  2863 g (6 lb 5 oz) F Vag-Spont   JOSÉ MIGUEL      Complications: Postpartum Hemorrhage   2 Term 10/25/18 41w0d  3033 g (6 lb 11 oz) M Vag-Spont      1 Term 17 38w4d  3118 g (6 lb 14 oz) F Vag-Spont   JOSÉ MIGUEL      Birth Comments: No prenatal care      Obstetric Comments   MENARCHE AT 12 REGULAR 7 DAYS           ROS:   Review of Systems - General ROS: negative  Psychological ROS: negative  Endocrine ROS: negative  Breast ROS: negative  Respiratory ROS: negative  Cardiovascular ROS: negative  Gastrointestinal ROS: negative  Genito-Urinary ROS: negative  Musculoskeletal ROS: negative  Neurological ROS: negative  Dermatological ROS: negative    Objective  Physical Exam:   Vitals:    25 1105   BP: 118/78       Uterine Size: size equals dates  FHT: 110-160 BPM    General appearance - alert, well appearing, and in no distress  Mental status - alert, oriented to person, place, and time  Abdomen- Soft, Gravid uterus, non-tender to palpation  Musculoskeletal: negative for - gait disturbance or joint pain  Extremeties:  negative swelling or cyanosis   Dermatological: negative rashes or skin lesions   3/70%/-3/mid/soft     Assessment/Plan:   Diagnoses and all orders for this visit:    1. Supervision of other normal pregnancy, antepartum (Primary)  -     POC Urinalysis Dipstick  -     Group B Strep Molecular by PCR - Swab, Vaginal/Rectum  -     CBC (No Diff)    2. Chronic hepatitis C complicating pregnancy, antepartum  Assessment & Plan:  HCV quant negative       3. Rubella non-immune status, antepartum  Assessment & Plan:  Plan vaccine after delivery      4. Anemia during pregnancy in third trimester  Assessment & Plan:  Iron supplmentation               Counseling:   OB precautions, leaking, VB, jessica avalos vs PTL/Labor  FKC  HTN precautions reviewed: HA, vision change, RUQ/epigastric pain, edema  IOL scheduled  IOL reviewed in detail.  R/B/A/SE/E.  All history reviewed and updated.  Pre-IOL exam performed.  Length can be 24-48+hrs.  PLAN: pitocin.  All questions answered.  She desires to proceed as planned.  She understands during early am the OB Hospitalist physicians will manage her labor and deliver prn any emergencies.    Round Ligament Pain:  The uterus has several ligaments which provide support and keep the uterus in place. As the  uterus grows these ligaments are pulled and stretched which often causes sharp stabbing like pain in the inguinal area.   You may find a pregnancy support band helpful. Changing positions may also help. Yoga is a great way to cope with round ligament and low back pain in pregnancy.    Massage may also help with low back pain   Things to Consider at this Point in your Pregnancy:  Some women experience swelling in their feet during pregnancy. Compression stockings may help  Drink plenty of water and stay active   Make sure you are eating frequent small meals, nuts are a wonderful snack to keep with you            Return in about 1 week (around 2/27/2025) for Routine OB visit.      We have gone  over prenatal care to include the timing and content of visits. I informed her how to contact the office and/or on call person in the event of any problems and encouraged her to do so when she feels it is necessary.  We then spent time answering her questions which she indicated were answered to her satisfaction.    Prabha Carson DO  2/20/2025 11:59 EST

## 2025-02-20 ENCOUNTER — ROUTINE PRENATAL (OUTPATIENT)
Dept: OBSTETRICS AND GYNECOLOGY | Facility: CLINIC | Age: 30
End: 2025-02-20
Payer: COMMERCIAL

## 2025-02-20 VITALS — WEIGHT: 166 LBS | SYSTOLIC BLOOD PRESSURE: 118 MMHG | DIASTOLIC BLOOD PRESSURE: 78 MMHG | BODY MASS INDEX: 30.36 KG/M2

## 2025-02-20 DIAGNOSIS — Z28.39 RUBELLA NON-IMMUNE STATUS, ANTEPARTUM: ICD-10-CM

## 2025-02-20 DIAGNOSIS — O09.899 RUBELLA NON-IMMUNE STATUS, ANTEPARTUM: ICD-10-CM

## 2025-02-20 DIAGNOSIS — Z34.80 SUPERVISION OF OTHER NORMAL PREGNANCY, ANTEPARTUM: Primary | ICD-10-CM

## 2025-02-20 DIAGNOSIS — O99.013 ANEMIA DURING PREGNANCY IN THIRD TRIMESTER: ICD-10-CM

## 2025-02-20 DIAGNOSIS — O98.419 CHRONIC HEPATITIS C COMPLICATING PREGNANCY, ANTEPARTUM: ICD-10-CM

## 2025-02-20 DIAGNOSIS — B18.2 CHRONIC HEPATITIS C COMPLICATING PREGNANCY, ANTEPARTUM: ICD-10-CM

## 2025-02-20 LAB
DEPRECATED RDW RBC AUTO: 41.2 FL (ref 37–54)
ERYTHROCYTE [DISTWIDTH] IN BLOOD BY AUTOMATED COUNT: 14.2 % (ref 12.3–15.4)
GLUCOSE UR STRIP-MCNC: NEGATIVE MG/DL
HCT VFR BLD AUTO: 31 % (ref 34–46.6)
HGB BLD-MCNC: 9.9 G/DL (ref 12–15.9)
MCH RBC QN AUTO: 26.1 PG (ref 26.6–33)
MCHC RBC AUTO-ENTMCNC: 31.9 G/DL (ref 31.5–35.7)
MCV RBC AUTO: 81.8 FL (ref 79–97)
PLATELET # BLD AUTO: 274 10*3/MM3 (ref 140–450)
PMV BLD AUTO: 10.1 FL (ref 6–12)
PROT UR STRIP-MCNC: NEGATIVE MG/DL
RBC # BLD AUTO: 3.79 10*6/MM3 (ref 3.77–5.28)
WBC NRBC COR # BLD AUTO: 6.43 10*3/MM3 (ref 3.4–10.8)

## 2025-02-20 PROCEDURE — 85027 COMPLETE CBC AUTOMATED: CPT | Performed by: OBSTETRICS & GYNECOLOGY

## 2025-02-20 PROCEDURE — 87653 STREP B DNA AMP PROBE: CPT | Performed by: OBSTETRICS & GYNECOLOGY

## 2025-02-21 ENCOUNTER — TELEPHONE (OUTPATIENT)
Dept: OBSTETRICS AND GYNECOLOGY | Facility: CLINIC | Age: 30
End: 2025-02-21
Payer: COMMERCIAL

## 2025-02-21 DIAGNOSIS — O99.013 ANEMIA DURING PREGNANCY IN THIRD TRIMESTER: Primary | ICD-10-CM

## 2025-02-21 LAB — GP B STREP DNA VAG+RECTUM QL NAA+PROBE: POSITIVE

## 2025-02-21 NOTE — TELEPHONE ENCOUNTER
----- Message from Prabha Carson sent at 2/21/2025  9:30 AM EST -----  Hemoglobin is low, recommend iron panel and iron infusions if patient has been taking iron regularly.  IF so, let me know and I will order infusions.     Electronically signed by:    Prabha Carson DO  02/21/25  09:29 EST

## 2025-02-21 NOTE — TELEPHONE ENCOUNTER
Patient informed of her results. She states she was unaware a script was sent for iron supplementation. She was advised of that and will  that script. Should she still proceed with the iron panel and infusions? Please advise.

## 2025-02-24 RX ORDER — FERROUS SULFATE 325(65) MG
325 TABLET ORAL EVERY OTHER DAY
Qty: 30 TABLET | Refills: 1 | Status: SHIPPED | OUTPATIENT
Start: 2025-02-24

## 2025-02-25 ENCOUNTER — CLINICAL SUPPORT (OUTPATIENT)
Dept: OBSTETRICS AND GYNECOLOGY | Facility: CLINIC | Age: 30
End: 2025-02-25
Payer: COMMERCIAL

## 2025-02-25 ENCOUNTER — PATIENT OUTREACH (OUTPATIENT)
Dept: LABOR AND DELIVERY | Facility: HOSPITAL | Age: 30
End: 2025-02-25
Payer: COMMERCIAL

## 2025-02-25 ENCOUNTER — HOSPITAL ENCOUNTER (OUTPATIENT)
Facility: HOSPITAL | Age: 30
Discharge: HOME | End: 2025-02-25
Attending: OBSTETRICS & GYNECOLOGY | Admitting: OBSTETRICS & GYNECOLOGY
Payer: COMMERCIAL

## 2025-02-25 VITALS
OXYGEN SATURATION: 99 % | RESPIRATION RATE: 20 BRPM | WEIGHT: 166 LBS | BODY MASS INDEX: 30.55 KG/M2 | HEART RATE: 80 BPM | DIASTOLIC BLOOD PRESSURE: 74 MMHG | SYSTOLIC BLOOD PRESSURE: 120 MMHG | TEMPERATURE: 98.4 F | HEIGHT: 62 IN

## 2025-02-25 DIAGNOSIS — O99.013 ANEMIA DURING PREGNANCY IN THIRD TRIMESTER: ICD-10-CM

## 2025-02-25 PROBLEM — N85.8 ALTERATION IN COMFORT ASSOCIATED WITH UTERINE CONTRACTIONS: Status: ACTIVE | Noted: 2025-02-25

## 2025-02-25 LAB
BILIRUB BLD-MCNC: NEGATIVE MG/DL
CLARITY, POC: CLEAR
COLOR UR: YELLOW
GLUCOSE UR STRIP-MCNC: NEGATIVE MG/DL
IRON 24H UR-MRATE: 37 MCG/DL (ref 37–145)
IRON SATN MFR SERPL: 5 % (ref 20–50)
KETONES UR QL: NEGATIVE
LEUKOCYTE EST, POC: ABNORMAL
NITRITE UR-MCNC: NEGATIVE MG/ML
PH UR: 5.5 [PH] (ref 5–8)
PROT UR STRIP-MCNC: NEGATIVE MG/DL
RBC # UR STRIP: ABNORMAL /UL
SP GR UR: 1.02 (ref 1–1.03)
TIBC SERPL-MCNC: 727 MCG/DL (ref 298–536)
TRANSFERRIN SERPL-MCNC: 488 MG/DL (ref 200–360)
UROBILINOGEN UR QL: NORMAL

## 2025-02-25 PROCEDURE — 83540 ASSAY OF IRON: CPT | Performed by: OBSTETRICS & GYNECOLOGY

## 2025-02-25 PROCEDURE — 84466 ASSAY OF TRANSFERRIN: CPT | Performed by: OBSTETRICS & GYNECOLOGY

## 2025-02-25 PROCEDURE — G0463 HOSPITAL OUTPT CLINIC VISIT: HCPCS

## 2025-02-25 PROCEDURE — 81002 URINALYSIS NONAUTO W/O SCOPE: CPT | Performed by: OBSTETRICS & GYNECOLOGY

## 2025-02-25 PROCEDURE — 59025 FETAL NON-STRESS TEST: CPT

## 2025-02-25 NOTE — PROGRESS NOTES
Venipuncture Blood Specimen Collection  Venipuncture performed in left arm by Renita Hernandez with good hemostasis. Patient tolerated the procedure well without complications.   02/25/25   Renita Hernandez

## 2025-02-25 NOTE — OUTREACH NOTE
[facility]  Check-In    Current Estimated Gestational Age: 38w5d      Questions/Answers      Flowsheet Row Responses   Best Method for Contacting Cell   Demographics Reviewed Yes   Currently Employed Yes   Able to keep appointments as scheduled Yes   Baby Active/Feeling Fetal Movemen Yes   How are you presently feeling? Good   New Diagnosis Anemia   Resource/Environmental Concerns None   Do you have any questions related to your care experience, your pregnancy, plans for delivery, any concerns, etc? No   Other Education Birth Plan            Transportation is no longer issue, has gotten vehicle. Was seen in labor and delivery last night for contractions. Discussed labor. All questions answered.         Ana Saavedra RN  Maternity Nurse Navigator    2/25/2025, 13:02 EST

## 2025-02-25 NOTE — H&P
PINEDA Mann  Obstetric History and Physical    Chief Complaint   Patient presents with    Contractions       Subjective     HPI:    Patient is a 29 y.o. female  currently at 38w5d, who presents with complaints of contractions that started around 10:30 pm tonight.  She reports contractions were occurring every 3-5 minutes.  She denies leaking amniotic fluid or vaginal bleeding.  She reports good fetal movement.  VE:  3/70/-3 on 25 office visit with Dr Carson.    PNC provided by:  [facility] [place]. Her prenatal care is complicated by   Patient Active Problem List   Diagnosis    Anxiety    Supervision of other normal pregnancy, antepartum    Chronic hepatitis C complicating pregnancy, antepartum    Rubella non-immune status, antepartum    Limited prenatal care, antepartum    Anemia during pregnancy in third trimester    Alteration in comfort associated with uterine contractions         Her previous obstetric/gynecological history is noted for   with  x4 .    The following portions of the patients history were reviewed and updated as appropriate:   current medications, allergies, past medical history, past surgical history, past family history, past social history and current problem list.     Prenatal Information:  Prenatal Results       Initial Prenatal Labs       Test Value Reference Range Date Time    Hemoglobin  12.2 g/dL 12.0 - 15.9 24 1414    Hematocrit  38.2 % 34.0 - 46.6 24 1414    Platelets  243 10*3/mm3 140 - 450 24 1414    Rubella IgG  <0.90 index Immune >0.99 24 1414    Hepatitis B SAg  Non-Reactive  Non-Reactive 24 1414    Hepatitis C Ab  Reactive  Non-Reactive 24 1414    RPR  Non Reactive  Non Reactive 24 1609       Non-Reactive  Non-Reactive 24 1414    T. Pallidum Ab         ABO  O   24 1414    Rh  Positive   24 1414    Antibody Screen  Negative   24 1414    HIV  Non-Reactive  Non-Reactive 24 1414    Urine Culture  <25,000  CFU/mL Normal Urogenital Mariela   08/12/24 1403    Gonorrhea  Negative  Negative 08/12/24 1403    Chlamydia  Negative  Negative 08/12/24 1403    TSH        HgB A1c         Varicella IgG        Hemoglobinopathy Fractionation  Comment   12/27/21 0944    Hemoglobinopathy (genetic testing)        Cystic fibrosis   Negative   08/12/24 1414    Spinal muscular atrophy  Negative   08/12/24 1414    Fragile X                  Fetal testing        Test Value Reference Range Date Time    NIPT        MSAFP        AFP-4                  2nd and 3rd Trimester       Test Value Reference Range Date Time    Hemoglobin (repeated)  9.9 g/dL 12.0 - 15.9 02/20/25 1140       10.6 g/dL 12.0 - 15.9 12/04/24 1609    Hematocrit (repeated)  31.0 % 34.0 - 46.6 02/20/25 1140       32.1 % 34.0 - 46.6 12/04/24 1609    Platelets   274 10*3/mm3 140 - 450 02/20/25 1140       257 10*3/mm3 140 - 450 12/04/24 1609       243 10*3/mm3 140 - 450 08/12/24 1414    1 hour GTT   124 mg/dL 65 - 139 12/04/24 1609    Antibody Screen (repeated)        3rd TM syphilis scrn (repeated)  RPR   Non Reactive  Non Reactive 12/04/24 1609    3rd TM syphilis scrn (repeated) TP-Ab        3rd TM syphilis screen TB-Ab (FTA)        Syphilis cascade test TP-Ab (EIA)        Syphilis cascade TPPA        GTT Fasting        GTT 1 Hr        GTT 2 Hr        GTT 3 Hr        Group B Strep  Positive  Negative 02/20/25 1136              Other testing        Test Value Reference Range Date Time    Parvo IgG         CMV IgG                   Drug Screening       Test Value Reference Range Date Time    Amphetamine Screen        Barbiturate Screen  Negative  Negative 08/12/24 1403    Benzodiazepine Screen  Negative  Negative 08/12/24 1403    Methadone Screen  Negative  Negative 08/12/24 1403    Phencyclidine Screen        Opiates Screen  Negative  Negative 08/12/24 1403    THC Screen  Negative  Negative 08/12/24 1403    Cocaine Screen  Negative  Negative 08/12/24 1403    Propoxyphene Screen         Buprenorphine Screen        Methamphetamine Screen        Oxycodone Screen  Negative  Negative 08/12/24 1403    Tricyclic Antidepressants Screen                  Legend    ^: Historical                          External Prenatal Results       Pregnancy Outside Results - Transcribed From Office Records - See Scanned Records For Details       Test Value Date Time    ABO  O  08/12/24 1414    Rh  Positive  08/12/24 1414    Antibody Screen  Negative  08/12/24 1414    Varicella IgG       Rubella  <0.90 index 08/12/24 1414    Hgb  9.9 g/dL 02/20/25 1140       10.6 g/dL 12/04/24 1609       12.2 g/dL 08/12/24 1414    Hct  31.0 % 02/20/25 1140       32.1 % 12/04/24 1609       38.2 % 08/12/24 1414    HgB A1c        1h GTT  124 mg/dL 12/04/24 1609    3h GTT Fasting       3h GTT 1 hour       3h GTT 2 hour       3h GTT 3 hour        Gonorrhea (discrete)  Negative  08/12/24 1403    Chlamydia (discrete)  Negative  08/12/24 1403    RPR  Non Reactive  12/04/24 1609       Non-Reactive  08/12/24 1414    Syphils cascade: TP-Ab (FTA)       TP-Ab       TP-Ab (EIA)       TPPA       HBsAg  Non-Reactive  08/12/24 1414    Herpes Simplex Virus PCR       Herpes Simplex VIrus Culture       HIV  Non-Reactive  08/12/24 1414    Hep C RNA Quant PCR       Hep C Antibody  Reactive  08/12/24 1414    AFP       NIPT       Cystic Fibrosis (Home)  Negative  08/12/24 1414    Cystic Fibroisis        Spinal Muscular atrophy  Negative  08/12/24 1414    Fragile X       Group B Strep  Positive  02/20/25 1136    GBS Susceptibility to Clindamycin       GBS Susceptibility to Erythromycin       Fetal Fibronectin       Genetic Testing, Maternal Blood                 Drug Screening       Test Value Date Time    Urine Drug Screen       Amphetamine Screen       Barbiturate Screen  Negative  08/12/24 1403    Benzodiazepine Screen  Negative  08/12/24 1403    Methadone Screen  Negative  08/12/24 1403    Phencyclidine Screen       Opiates Screen  Negative  08/12/24  1403    THC Screen  Negative  24 1403    Cocaine Screen       Propoxyphene Screen       Buprenorphine Screen       Methamphetamine Screen       Oxycodone Screen  Negative  24 1403    Tricyclic Antidepressants Screen                 Legend    ^: Historical                             Past OB History:     OB History    Para Term  AB Living   6 4 4 0 1 4   SAB IAB Ectopic Molar Multiple Live Births   1 0 0 0 0 4      # Outcome Date GA Lbr Isai/2nd Weight Sex Type Anes PTL Lv   6 Current            5 SAB 2024 8w0d    SAB      4 Term 22 39w1d 8768:56 / 00:14 3035 g (6 lb 11.1 oz) M Vag-Spont EPI N JOSÉ MIGUEL      Name: ANAI SANTIAGO      Apgar1: 8  Apgar5: 9   3 Term 19 38w6d  2863 g (6 lb 5 oz) F Vag-Spont   JOSÉ MIGUEL      Complications: Postpartum Hemorrhage   2 Term 10/25/18 41w0d  3033 g (6 lb 11 oz) M Vag-Spont      1 Term 17 38w4d  3118 g (6 lb 14 oz) F Vag-Spont   JOSÉ MIGUEL      Birth Comments: No prenatal care      Obstetric Comments   MENARCHE AT 12 REGULAR 7 DAYS       Past Medical History: Past Medical History:   Diagnosis Date    Cholelithiasis     Chronic hepatitis C complicating pregnancy, antepartum 2022 Quantitative values qtrimester; referral to GI [facility] for treatment     Constipation, unspecified     Hepatitis C     ON MEDS- ON CURRENT PROBLEMS    History of blood transfusion [facility], G3     Hx of chlamydia infection     L4-L5 disc bulge 2015    Miscarriage     2024    Other specified noninflammatory disorders of vagina     Unspecified viral hepatitis C without hepatic coma       Past Surgical History Past Surgical History:   Procedure Laterality Date    CHOLECYSTECTOMY N/A 2023    Procedure: CHOLECYSTECTOMY LAPAROSCOPIC;  Surgeon: Lai Miller MD;  Location: [facility] MAIN OR;  Service: General;  Laterality: N/A;    EYE SURGERY Bilateral     x2    HERNIA REPAIR  2009    Umbilical, mesh    WISDOM TOOTH EXTRACTION        Family  History: Family History   Problem Relation Age of Onset    Breast cancer Mother     Ovarian cancer Mother     Breast cancer Maternal Aunt     Thyroid disease Maternal Grandmother     Heart disease Maternal Grandmother     Diabetes Maternal Grandmother     Uterine cancer Neg Hx     Colon cancer Neg Hx     Melanoma Neg Hx     Clotting disorder Neg Hx     Alcohol abuse Neg Hx     Arthritis Neg Hx     Asthma Neg Hx     Birth defects Neg Hx     Cancer Neg Hx     COPD Neg Hx     Bleeding Disorder Neg Hx     Depression Neg Hx     Drug abuse Neg Hx     Early death Neg Hx     Hearing loss Neg Hx     Hyperlipidemia Neg Hx     Hypertension Neg Hx     Kidney disease Neg Hx     Learning disabilities Neg Hx     Malig Hyperthermia Neg Hx     Mental illness Neg Hx     Mental retardation Neg Hx     Miscarriages / Stillbirths Neg Hx       Social History:  reports that she has never smoked. She has never been exposed to tobacco smoke. She has never used smokeless tobacco.   reports no history of alcohol use.   reports no history of drug use.        General ROS: Pertinent items are noted in HPI    Home Medications:  Prenatal MV-Min-FA-Omega-3 and ferrous sulfate    Allergies:  Allergies   Allergen Reactions    Haemophilus Influenzae Vaccines Unknown - High Severity    Influenza Virus Vaccine [Influenza Virus Vac Live Quad] Unknown - High Severity       Objective       Vital Signs Range for the last 24 hours  Temperature: Temp:  [98.4 °F (36.9 °C)] 98.4 °F (36.9 °C)   Temp Source: Temp src: Oral   BP: BP: (112-120)/(74-77) 120/74   Pulse: Heart Rate:  [] 80   Respirations: Resp:  [20] 20   SPO2: SpO2:  [99 %] 99 %     Physical Examination:   General appearance - alert, well appearing, and in no distress  Mental status - alert, oriented to person, place, and time  Chest - good respiratory effort. No sign of resp distress  Heart - normal rate, regular rhythm  Abdomen - soft, nontender, nondistended,   Pelvic - normal external  genitalia, vulva, vagina, cervix, and uterus   Back exam - full range of motion, no tenderness or pain on motion  Neurological - alert, oriented, normal speech  Extremities - peripheral pulses normal  Skin - normal coloration and turgor, no suspicious skin lesions noted    Presentation: Cephalic   Cervix: Method: sterile vaginal exam performed   Dilation: Cervical Dilation (cm): 4-5   Effacement: Cervical Effacement: 70   Station:         Fetal Heart Rate Assessment   Method: Fetal HR Assessment Method: external   Beats/min: Fetal HR (beats/min): 135   Baseline: Fetal HR Baseline: normal range   Variability: Fetal HR Variability: moderate (amplitude range 6 to 25 bpm)   Accels: Fetal HR Accelerations: episodic, greater than/equal to 15 bpm, lasting at least 15 seconds   Decels: Fetal HR Decelerations: absent   Tracing Category:       Uterine Assessment   Method: Method: external tocotransducer, palpation   Frequency (min):     Ctx Count in 10 min:     Duration:     Intensity: Contraction Intensity: mild by palpation   Brillion Units:       GBS is positive     Assessment & Plan       Alteration in comfort associated with uterine contractions        Assessment:  1.  Intrauterine pregnancy at 38w5d gestation with reactive, reassuring fetal status.    2.  Uterine contractions  3.  Obstetrical history significant for   x4, SAB x1 .  4.  GBS status:   Group B Strep, DNA   Date Value Ref Range Status   2025 Positive (A) Negative Final       Plan:  1. ambulate with intermittent monitoring, re-evaluate for cervical change in 2-3 hrs, and discharge to home after no cervical change.  Contractions starting to space out to every 8-10 minutes at discharge.  2.  Patient lives 5 minutes from the hospital and wishes to go home at this time.  3.  Encourage po fluid hydration.  4.  Plan of care has been reviewed with patient and patient agrees.   5.  Risks, benefits of treatment plan have been discussed.  6.  All questions  have been answered.  Labor precautions given.  7.  Keep next appointment scheduled with Dr Dasilva on 2/27/25.    Abbi Jasso MD        Electronically signed by Abbi Jasso MD, 02/25/25, 2:22 AM EST.

## 2025-02-25 NOTE — NURSING NOTE
at nurses station, monitor strip viewed with irreg. Ctx: noted, order received to allow pt. To amb. Then recheck cervix for change.

## 2025-02-25 NOTE — NURSING NOTE
at bedside assessing pt., plan of care discussed with pt., monitor strip viewed, ctx: have spaced out since PO hydration and amb., pt. Desires to go home.

## 2025-02-25 NOTE — NURSING NOTE
"  38.5 weeks presents with c/o regular, intense ctx: since , pt. Denies leakage of fluid or vaginal bleeding, states +fm noted, states \" stripped my membranes Thursday and I was dilated 3cm\", irreg. Ctx: noted on monitor, palpate moderate, abd. Soft, non-tender with palpation, sve4-5/70/-3 no blood or fluid noted on exam glove,[id number],  notified of the above states she will come and assess pt.  "

## 2025-02-25 NOTE — NON STRESS TEST
"Obstetrical Non-stress Test Interpretation     Name:  Benita KEEN  MRN: [id number]    29 y.o. female  at 38w5d    Indication: contractions      Fetal Assessment  Fetal Movement: active  Fetal HR Assessment Method: external  Fetal HR (beats/min): 135  Fetal HR Baseline: normal range  Fetal HR Variability: moderate (amplitude range 6 to 25 bpm)  Fetal HR Accelerations: episodic, greater than/equal to 15 bpm, lasting at least 15 seconds  Fetal HR Decelerations: absent  Sinusoidal Pattern Present: absent    /74   Pulse 80   Temp 98.4 °F (36.9 °C) (Oral)   Resp 20   Ht 157.5 cm (62\")   Wt 75.3 kg (166 lb)   LMP 2024   SpO2 99%   BMI 30.36 kg/m²     Reason for test:  contractions  Date of Test: 2025  Time frame of test: 4681-4935  RN NST Interpretation:  reactive for gestational age.      Mecca Mendez RN  2025  02:38 EST  "

## 2025-02-26 ENCOUNTER — TELEPHONE (OUTPATIENT)
Dept: OBSTETRICS AND GYNECOLOGY | Facility: CLINIC | Age: 30
End: 2025-02-26
Payer: COMMERCIAL

## 2025-02-26 NOTE — TELEPHONE ENCOUNTER
----- Message from Prabha Carson sent at 2/26/2025  7:43 AM EST -----  Iron panel does show iron deficiency anemia, recommend continue iron supplement.     Electronically signed by:    Prabha Carson DO  02/26/25  07:43 EST

## 2025-02-26 NOTE — TELEPHONE ENCOUNTER
"  Caller: Benita KEEN \"Lalita\"    Relationship: Self    Best call back number: [phone]    What was the call regarding: PT FOLLOWING UP ON RESCHEDULING HER APPT TOMORROW. WOULD LIKE A CALL BACK ASAP  "

## 2025-02-26 NOTE — TELEPHONE ENCOUNTER
Left a message for the patient to let her know her results do show iron deficiency anemia and she needs to continue the iron supplementation.

## 2025-02-26 NOTE — TELEPHONE ENCOUNTER
"    Caller: Benita KEEN \"Lalita\"    Relationship to patient: Self    Best call back number: [phone] (home)       Patient is needing: PT IS SCHEDULED WITH DR CARBONE TOMORROW MORNING. SHE HAS HAD AN EMERGENCY WITH HER BROTHER IN LAW WHO IS A SOLDIER @ [facility] AND HAS TO GO THERE. SHE IS WANTING TO KNOW IF SHE COULD COME IN TOMORROW AROUND 2 OR 3 PM. SHE IS SCHEDULED FOR INDUCTION WITH DR CARBONE ON SATURDAY 03/01. SHE WILL SEE THE CHANGE IN MY CHART IF POSSIBLE.      "

## 2025-03-01 ENCOUNTER — ANESTHESIA EVENT (OUTPATIENT)
Dept: LABOR AND DELIVERY | Facility: HOSPITAL | Age: 30
End: 2025-03-01
Payer: COMMERCIAL

## 2025-03-01 ENCOUNTER — HOSPITAL ENCOUNTER (OUTPATIENT)
Dept: LABOR AND DELIVERY | Facility: HOSPITAL | Age: 30
Discharge: HOME | End: 2025-03-01
Payer: COMMERCIAL

## 2025-03-01 ENCOUNTER — ANESTHESIA (OUTPATIENT)
Dept: LABOR AND DELIVERY | Facility: HOSPITAL | Age: 30
End: 2025-03-01
Payer: COMMERCIAL

## 2025-03-01 ENCOUNTER — HOSPITAL ENCOUNTER (INPATIENT)
Facility: HOSPITAL | Age: 30
LOS: 2 days | Discharge: HOME | End: 2025-03-03
Attending: STUDENT IN AN ORGANIZED HEALTH CARE EDUCATION/TRAINING PROGRAM | Admitting: STUDENT IN AN ORGANIZED HEALTH CARE EDUCATION/TRAINING PROGRAM
Payer: COMMERCIAL

## 2025-03-01 PROBLEM — Z34.90 ENCOUNTER FOR INDUCTION OF LABOR: Status: ACTIVE | Noted: 2025-03-01

## 2025-03-01 LAB
ABO GROUP BLD: NORMAL
AMPHET+METHAMPHET UR QL: NEGATIVE
AMPHETAMINES UR QL: NEGATIVE
ATMOSPHERIC PRESS: 745.6 MMHG
ATMOSPHERIC PRESS: 746.8 MMHG
BARBITURATES UR QL SCN: NEGATIVE
BASE EXCESS BLDCOA CALC-SCNC: -10.9 MMOL/L (ref -2–2)
BASE EXCESS BLDCOV CALC-SCNC: -8.8 MMOL/L (ref -30–30)
BENZODIAZ UR QL SCN: NEGATIVE
BLD GP AB SCN SERPL QL: NEGATIVE
BUPRENORPHINE SERPL-MCNC: NEGATIVE NG/ML
CANNABINOIDS SERPL QL: NEGATIVE
COCAINE UR QL: NEGATIVE
DEPRECATED RDW RBC AUTO: 43.4 FL (ref 37–54)
ERYTHROCYTE [DISTWIDTH] IN BLOOD BY AUTOMATED COUNT: 14.6 % (ref 12.3–15.4)
FENTANYL UR-MCNC: NEGATIVE NG/ML
HCO3 BLDCOA-SCNC: 17.7 MMOL/L
HCO3 BLDCOV-SCNC: 17 MMOL/L
HCT VFR BLD AUTO: 29.8 % (ref 34–46.6)
HCT VFR BLD AUTO: 30.2 % (ref 34–46.6)
HGB BLD-MCNC: 9.2 G/DL (ref 12–15.9)
HGB BLD-MCNC: 9.2 G/DL (ref 12–15.9)
MCH RBC QN AUTO: 25.1 PG (ref 26.6–33)
MCHC RBC AUTO-ENTMCNC: 30.9 G/DL (ref 31.5–35.7)
MCV RBC AUTO: 81.4 FL (ref 79–97)
METHADONE UR QL SCN: NEGATIVE
OPIATES UR QL: NEGATIVE
OXYCODONE UR QL SCN: NEGATIVE
PCO2 BLDCOA: 48.2 MMHG (ref 33–49)
PCO2 BLDCOV: 36 MM HG (ref 35–51.3)
PCP UR QL SCN: NEGATIVE
PH BLDCOA: 7.17 PH UNITS (ref 7.18–7.34)
PH BLDCOV: 7.28 PH UNITS (ref 7.26–7.4)
PLATELET # BLD AUTO: 205 10*3/MM3 (ref 140–450)
PMV BLD AUTO: 10.4 FL (ref 6–12)
PO2 BLDCOA: 22.9 MMHG
PO2 BLDCOV: 27.8 MM HG (ref 19–39)
RBC # BLD AUTO: 3.66 10*6/MM3 (ref 3.77–5.28)
RH BLD: POSITIVE
SAO2 % BLDCOA: 27.3 %
SAO2 % BLDCOV: 45.3 %
T&S EXPIRATION DATE: NORMAL
TREPONEMA PALLIDUM IGG+IGM AB [PRESENCE] IN SERUM OR PLASMA BY IMMUNOASSAY: NORMAL
TRICYCLICS UR QL SCN: NEGATIVE
WBC NRBC COR # BLD AUTO: 6.38 10*3/MM3 (ref 3.4–10.8)

## 2025-03-01 PROCEDURE — 25810000003 LACTATED RINGERS SOLUTION: Performed by: STUDENT IN AN ORGANIZED HEALTH CARE EDUCATION/TRAINING PROGRAM

## 2025-03-01 PROCEDURE — 25010000002 ONDANSETRON PER 1 MG: Performed by: STUDENT IN AN ORGANIZED HEALTH CARE EDUCATION/TRAINING PROGRAM

## 2025-03-01 PROCEDURE — 59025 FETAL NON-STRESS TEST: CPT

## 2025-03-01 PROCEDURE — 80307 DRUG TEST PRSMV CHEM ANLYZR: CPT | Performed by: STUDENT IN AN ORGANIZED HEALTH CARE EDUCATION/TRAINING PROGRAM

## 2025-03-01 PROCEDURE — 86850 RBC ANTIBODY SCREEN: CPT | Performed by: STUDENT IN AN ORGANIZED HEALTH CARE EDUCATION/TRAINING PROGRAM

## 2025-03-01 PROCEDURE — 25010000002 LIDOCAINE-EPINEPHRINE (PF) 2 %-1:200000 SOLUTION: Performed by: REGISTERED NURSE

## 2025-03-01 PROCEDURE — 3E033VJ INTRODUCTION OF OTHER HORMONE INTO PERIPHERAL VEIN, PERCUTANEOUS APPROACH: ICD-10-PCS | Performed by: STUDENT IN AN ORGANIZED HEALTH CARE EDUCATION/TRAINING PROGRAM

## 2025-03-01 PROCEDURE — 10907ZC DRAINAGE OF AMNIOTIC FLUID, THERAPEUTIC FROM PRODUCTS OF CONCEPTION, VIA NATURAL OR ARTIFICIAL OPENING: ICD-10-PCS | Performed by: STUDENT IN AN ORGANIZED HEALTH CARE EDUCATION/TRAINING PROGRAM

## 2025-03-01 PROCEDURE — 86901 BLOOD TYPING SEROLOGIC RH(D): CPT | Performed by: STUDENT IN AN ORGANIZED HEALTH CARE EDUCATION/TRAINING PROGRAM

## 2025-03-01 PROCEDURE — 25010000002 OXYTOCIN PER 10 UNITS: Performed by: STUDENT IN AN ORGANIZED HEALTH CARE EDUCATION/TRAINING PROGRAM

## 2025-03-01 PROCEDURE — 51702 INSERT TEMP BLADDER CATH: CPT

## 2025-03-01 PROCEDURE — 25810000003 LACTATED RINGERS PER 1000 ML: Performed by: STUDENT IN AN ORGANIZED HEALTH CARE EDUCATION/TRAINING PROGRAM

## 2025-03-01 PROCEDURE — 86900 BLOOD TYPING SEROLOGIC ABO: CPT | Performed by: STUDENT IN AN ORGANIZED HEALTH CARE EDUCATION/TRAINING PROGRAM

## 2025-03-01 PROCEDURE — 25010000002 METHYLERGONOVINE MALEATE PER 0.2 MG: Performed by: STUDENT IN AN ORGANIZED HEALTH CARE EDUCATION/TRAINING PROGRAM

## 2025-03-01 PROCEDURE — C1755 CATHETER, INTRASPINAL: HCPCS | Performed by: REGISTERED NURSE

## 2025-03-01 PROCEDURE — 86780 TREPONEMA PALLIDUM: CPT | Performed by: STUDENT IN AN ORGANIZED HEALTH CARE EDUCATION/TRAINING PROGRAM

## 2025-03-01 PROCEDURE — 25010000002 LIDOCAINE PF 2% 2 % SOLUTION: Performed by: REGISTERED NURSE

## 2025-03-01 PROCEDURE — 59410 OBSTETRICAL CARE: CPT | Performed by: STUDENT IN AN ORGANIZED HEALTH CARE EDUCATION/TRAINING PROGRAM

## 2025-03-01 PROCEDURE — 25010000002 FENTANYL CITRATE (PF) 50 MCG/ML SOLUTION: Performed by: REGISTERED NURSE

## 2025-03-01 PROCEDURE — 85027 COMPLETE CBC AUTOMATED: CPT | Performed by: STUDENT IN AN ORGANIZED HEALTH CARE EDUCATION/TRAINING PROGRAM

## 2025-03-01 PROCEDURE — 25810000003 SODIUM CHLORIDE 0.9 % SOLUTION: Performed by: STUDENT IN AN ORGANIZED HEALTH CARE EDUCATION/TRAINING PROGRAM

## 2025-03-01 PROCEDURE — 85018 HEMOGLOBIN: CPT | Performed by: STUDENT IN AN ORGANIZED HEALTH CARE EDUCATION/TRAINING PROGRAM

## 2025-03-01 PROCEDURE — 82803 BLOOD GASES ANY COMBINATION: CPT | Performed by: STUDENT IN AN ORGANIZED HEALTH CARE EDUCATION/TRAINING PROGRAM

## 2025-03-01 PROCEDURE — 59899 UNLISTED PX MAT CARE&DLVR: CPT | Performed by: STUDENT IN AN ORGANIZED HEALTH CARE EDUCATION/TRAINING PROGRAM

## 2025-03-01 PROCEDURE — 25010000002 PENICILLIN G POTASSIUM PER 600000 UNITS: Performed by: STUDENT IN AN ORGANIZED HEALTH CARE EDUCATION/TRAINING PROGRAM

## 2025-03-01 PROCEDURE — 85014 HEMATOCRIT: CPT | Performed by: STUDENT IN AN ORGANIZED HEALTH CARE EDUCATION/TRAINING PROGRAM

## 2025-03-01 RX ORDER — ACETAMINOPHEN 325 MG/1
650 TABLET ORAL ONCE AS NEEDED
Status: DISCONTINUED | OUTPATIENT
Start: 2025-03-01 | End: 2025-03-02

## 2025-03-01 RX ORDER — LIDOCAINE HYDROCHLORIDE 20 MG/ML
INJECTION, SOLUTION EPIDURAL; INFILTRATION; INTRACAUDAL; PERINEURAL
Status: COMPLETED | OUTPATIENT
Start: 2025-03-01 | End: 2025-03-01

## 2025-03-01 RX ORDER — PROMETHAZINE HYDROCHLORIDE 25 MG/1
25 TABLET ORAL EVERY 6 HOURS PRN
Status: DISCONTINUED | OUTPATIENT
Start: 2025-03-01 | End: 2025-03-02

## 2025-03-01 RX ORDER — EPHEDRINE SULFATE 50 MG/ML
INJECTION, SOLUTION INTRAVENOUS AS NEEDED
Status: DISCONTINUED | OUTPATIENT
Start: 2025-03-01 | End: 2025-03-01 | Stop reason: SURG

## 2025-03-01 RX ORDER — PROMETHAZINE HYDROCHLORIDE 25 MG/1
12.5 TABLET ORAL EVERY 6 HOURS PRN
Status: DISCONTINUED | OUTPATIENT
Start: 2025-03-01 | End: 2025-03-02

## 2025-03-01 RX ORDER — FENTANYL CITRATE 50 UG/ML
INJECTION, SOLUTION INTRAMUSCULAR; INTRAVENOUS
Status: COMPLETED | OUTPATIENT
Start: 2025-03-01 | End: 2025-03-01

## 2025-03-01 RX ORDER — IBUPROFEN 800 MG/1
800 TABLET, FILM COATED ORAL ONCE AS NEEDED
Status: DISCONTINUED | OUTPATIENT
Start: 2025-03-01 | End: 2025-03-02

## 2025-03-01 RX ORDER — CARBOPROST TROMETHAMINE 250 UG/ML
250 INJECTION, SOLUTION INTRAMUSCULAR ONCE
Status: COMPLETED | OUTPATIENT
Start: 2025-03-01 | End: 2025-03-01

## 2025-03-01 RX ORDER — FENTANYL/ROPIVACAINE/NS/PF 2MCG/ML-.2
PLASTIC BAG, INJECTION (ML) INJECTION
Status: COMPLETED
Start: 2025-03-01 | End: 2025-03-01

## 2025-03-01 RX ORDER — LIDOCAINE HYDROCHLORIDE 10 MG/ML
0.5 INJECTION, SOLUTION EPIDURAL; INFILTRATION; INTRACAUDAL; PERINEURAL ONCE AS NEEDED
Status: DISCONTINUED | OUTPATIENT
Start: 2025-03-01 | End: 2025-03-02

## 2025-03-01 RX ORDER — FAMOTIDINE 10 MG/ML
20 INJECTION, SOLUTION INTRAVENOUS 2 TIMES DAILY PRN
Status: DISCONTINUED | OUTPATIENT
Start: 2025-03-01 | End: 2025-03-02

## 2025-03-01 RX ORDER — OXYTOCIN/0.9 % SODIUM CHLORIDE 30/500 ML
2 PLASTIC BAG, INJECTION (ML) INTRAVENOUS
Status: DISCONTINUED | OUTPATIENT
Start: 2025-03-01 | End: 2025-03-02

## 2025-03-01 RX ORDER — OXYTOCIN/0.9 % SODIUM CHLORIDE 30/500 ML
999 PLASTIC BAG, INJECTION (ML) INTRAVENOUS ONCE
Status: COMPLETED | OUTPATIENT
Start: 2025-03-01 | End: 2025-03-01

## 2025-03-01 RX ORDER — METHYLERGONOVINE MALEATE 0.2 MG/ML
200 INJECTION INTRAVENOUS ONCE AS NEEDED
Status: COMPLETED | OUTPATIENT
Start: 2025-03-01 | End: 2025-03-01

## 2025-03-01 RX ORDER — ONDANSETRON 2 MG/ML
4 INJECTION INTRAMUSCULAR; INTRAVENOUS EVERY 6 HOURS PRN
Status: DISCONTINUED | OUTPATIENT
Start: 2025-03-01 | End: 2025-03-02

## 2025-03-01 RX ORDER — METOCLOPRAMIDE HYDROCHLORIDE 5 MG/ML
10 INJECTION INTRAMUSCULAR; INTRAVENOUS ONCE AS NEEDED
Status: DISCONTINUED | OUTPATIENT
Start: 2025-03-01 | End: 2025-03-02

## 2025-03-01 RX ORDER — SODIUM CHLORIDE 0.9 % (FLUSH) 0.9 %
10 SYRINGE (ML) INJECTION EVERY 12 HOURS SCHEDULED
Status: DISCONTINUED | OUTPATIENT
Start: 2025-03-01 | End: 2025-03-02

## 2025-03-01 RX ORDER — PHENYLEPHRINE HCL IN 0.9% NACL 1 MG/10 ML
SYRINGE (ML) INTRAVENOUS AS NEEDED
Status: DISCONTINUED | OUTPATIENT
Start: 2025-03-01 | End: 2025-03-01 | Stop reason: SURG

## 2025-03-01 RX ORDER — OXYTOCIN/0.9 % SODIUM CHLORIDE 30/500 ML
250 PLASTIC BAG, INJECTION (ML) INTRAVENOUS CONTINUOUS
Status: DISPENSED | OUTPATIENT
Start: 2025-03-01 | End: 2025-03-01

## 2025-03-01 RX ORDER — HYDROCODONE BITARTRATE AND ACETAMINOPHEN 10; 325 MG/1; MG/1
1 TABLET ORAL EVERY 4 HOURS PRN
Status: DISCONTINUED | OUTPATIENT
Start: 2025-03-01 | End: 2025-03-02

## 2025-03-01 RX ORDER — FENTANYL CITRATE 50 UG/ML
INJECTION, SOLUTION INTRAMUSCULAR; INTRAVENOUS
Status: COMPLETED
Start: 2025-03-01 | End: 2025-03-01

## 2025-03-01 RX ORDER — HYDROCODONE BITARTRATE AND ACETAMINOPHEN 5; 325 MG/1; MG/1
1 TABLET ORAL EVERY 4 HOURS PRN
Status: DISCONTINUED | OUTPATIENT
Start: 2025-03-01 | End: 2025-03-02

## 2025-03-01 RX ORDER — FAMOTIDINE 10 MG/ML
20 INJECTION, SOLUTION INTRAVENOUS ONCE AS NEEDED
Status: DISCONTINUED | OUTPATIENT
Start: 2025-03-01 | End: 2025-03-02

## 2025-03-01 RX ORDER — FAMOTIDINE 20 MG/1
20 TABLET, FILM COATED ORAL ONCE AS NEEDED
Status: DISCONTINUED | OUTPATIENT
Start: 2025-03-01 | End: 2025-03-02

## 2025-03-01 RX ORDER — LIDOCAINE HCL/EPINEPHRINE/PF 2%-1:200K
VIAL (ML) INJECTION
Status: COMPLETED
Start: 2025-03-01 | End: 2025-03-01

## 2025-03-01 RX ORDER — TERBUTALINE SULFATE 1 MG/ML
0.25 INJECTION SUBCUTANEOUS AS NEEDED
Status: DISCONTINUED | OUTPATIENT
Start: 2025-03-01 | End: 2025-03-02

## 2025-03-01 RX ORDER — ONDANSETRON 4 MG/1
4 TABLET, ORALLY DISINTEGRATING ORAL EVERY 6 HOURS PRN
Status: DISCONTINUED | OUTPATIENT
Start: 2025-03-01 | End: 2025-03-02

## 2025-03-01 RX ORDER — TRANEXAMIC ACID 10 MG/ML
INJECTION, SOLUTION INTRAVENOUS
Status: COMPLETED
Start: 2025-03-01 | End: 2025-03-01

## 2025-03-01 RX ORDER — CITRIC ACID/SODIUM CITRATE 334-500MG
30 SOLUTION, ORAL ORAL ONCE AS NEEDED
Status: DISCONTINUED | OUTPATIENT
Start: 2025-03-01 | End: 2025-03-02

## 2025-03-01 RX ORDER — SODIUM CHLORIDE 0.9 % (FLUSH) 0.9 %
10 SYRINGE (ML) INJECTION AS NEEDED
Status: DISCONTINUED | OUTPATIENT
Start: 2025-03-01 | End: 2025-03-02

## 2025-03-01 RX ORDER — LIDOCAINE HCL/EPINEPHRINE/PF 2%-1:200K
VIAL (ML) INJECTION AS NEEDED
Status: DISCONTINUED | OUTPATIENT
Start: 2025-03-01 | End: 2025-03-01 | Stop reason: SURG

## 2025-03-01 RX ORDER — MISOPROSTOL 200 UG/1
800 TABLET ORAL AS NEEDED
Status: DISCONTINUED | OUTPATIENT
Start: 2025-03-01 | End: 2025-03-02

## 2025-03-01 RX ORDER — SODIUM CHLORIDE 9 MG/ML
40 INJECTION, SOLUTION INTRAVENOUS AS NEEDED
Status: DISCONTINUED | OUTPATIENT
Start: 2025-03-01 | End: 2025-03-02

## 2025-03-01 RX ORDER — ACETAMINOPHEN 325 MG/1
650 TABLET ORAL EVERY 4 HOURS PRN
Status: DISCONTINUED | OUTPATIENT
Start: 2025-03-01 | End: 2025-03-02

## 2025-03-01 RX ORDER — FAMOTIDINE 20 MG/1
20 TABLET, FILM COATED ORAL 2 TIMES DAILY PRN
Status: DISCONTINUED | OUTPATIENT
Start: 2025-03-01 | End: 2025-03-02

## 2025-03-01 RX ORDER — LIDOCAINE HYDROCHLORIDE AND EPINEPHRINE 15; 5 MG/ML; UG/ML
INJECTION, SOLUTION EPIDURAL
Status: COMPLETED | OUTPATIENT
Start: 2025-03-01 | End: 2025-03-01

## 2025-03-01 RX ORDER — SODIUM CHLORIDE, SODIUM LACTATE, POTASSIUM CHLORIDE, CALCIUM CHLORIDE 600; 310; 30; 20 MG/100ML; MG/100ML; MG/100ML; MG/100ML
125 INJECTION, SOLUTION INTRAVENOUS CONTINUOUS
Status: DISCONTINUED | OUTPATIENT
Start: 2025-03-01 | End: 2025-03-02

## 2025-03-01 RX ORDER — LIDOCAINE HYDROCHLORIDE 20 MG/ML
INJECTION, SOLUTION EPIDURAL; INFILTRATION; INTRACAUDAL; PERINEURAL
Status: COMPLETED
Start: 2025-03-01 | End: 2025-03-01

## 2025-03-01 RX ORDER — MORPHINE SULFATE 10 MG/ML
5 INJECTION INTRAMUSCULAR; INTRAVENOUS; SUBCUTANEOUS
Status: DISCONTINUED | OUTPATIENT
Start: 2025-03-01 | End: 2025-03-02

## 2025-03-01 RX ADMIN — TRANEXAMIC ACID 1000 MG: 10 INJECTION, SOLUTION INTRAVENOUS at 20:25

## 2025-03-01 RX ADMIN — LIDOCAINE HYDROCHLORIDE AND EPINEPHRINE 2 ML: 15; 5 INJECTION, SOLUTION EPIDURAL at 15:05

## 2025-03-01 RX ADMIN — Medication 150 MCG: at 15:33

## 2025-03-01 RX ADMIN — SODIUM CHLORIDE 999 ML/HR: 9 INJECTION, SOLUTION INTRAVENOUS at 20:49

## 2025-03-01 RX ADMIN — SODIUM CHLORIDE 2.5 MILLION UNITS: 9 INJECTION, SOLUTION INTRAVENOUS at 19:20

## 2025-03-01 RX ADMIN — FENTANYL CITRATE 100 MCG: 50 INJECTION, SOLUTION INTRAMUSCULAR; INTRAVENOUS at 15:05

## 2025-03-01 RX ADMIN — EPHEDRINE SULFATE 15 MG: 50 INJECTION INTRAVENOUS at 15:15

## 2025-03-01 RX ADMIN — METHYLERGONOVINE MALEATE 200 MCG: 0.2 INJECTION, SOLUTION INTRAMUSCULAR; INTRAVENOUS at 20:20

## 2025-03-01 RX ADMIN — Medication 200 MCG: at 15:13

## 2025-03-01 RX ADMIN — CARBOPROST TROMETHAMINE 250 MCG: 250 INJECTION, SOLUTION INTRAMUSCULAR at 20:22

## 2025-03-01 RX ADMIN — WITCH HAZEL: 500 SOLUTION RECTAL; TOPICAL at 23:57

## 2025-03-01 RX ADMIN — FENTANYL CITRATE 100 MCG: 50 INJECTION, SOLUTION INTRAMUSCULAR; INTRAVENOUS at 19:34

## 2025-03-01 RX ADMIN — Medication 200 MCG: at 15:11

## 2025-03-01 RX ADMIN — SODIUM CHLORIDE 2 MILLI-UNITS/MIN: 9 INJECTION, SOLUTION INTRAVENOUS at 11:52

## 2025-03-01 RX ADMIN — PENICILLIN G POTASSIUM 5 MILLION UNITS: 5000000 INJECTION, POWDER, FOR SOLUTION INTRAMUSCULAR; INTRAVENOUS at 11:24

## 2025-03-01 RX ADMIN — SODIUM CHLORIDE, SODIUM LACTATE, POTASSIUM CHLORIDE, CALCIUM CHLORIDE 125 ML/HR: 20; 30; 600; 310 INJECTION, SOLUTION INTRAVENOUS at 11:24

## 2025-03-01 RX ADMIN — BENZOCAINE AND LEVOMENTHOL: 200; 5 SPRAY TOPICAL at 23:57

## 2025-03-01 RX ADMIN — Medication 6 ML/HR: at 15:26

## 2025-03-01 RX ADMIN — EPHEDRINE SULFATE 35 MG: 50 INJECTION, SOLUTION INTRAVENOUS at 15:16

## 2025-03-01 RX ADMIN — LIDOCAINE HYDROCHLORIDE,EPINEPHRINE BITARTRATE 6 ML: 20; .005 INJECTION, SOLUTION EPIDURAL; INFILTRATION; INTRACAUDAL; PERINEURAL at 19:47

## 2025-03-01 RX ADMIN — SODIUM CHLORIDE, SODIUM LACTATE, POTASSIUM CHLORIDE, AND CALCIUM CHLORIDE 1000 ML: .6; .31; .03; .02 INJECTION, SOLUTION INTRAVENOUS at 20:46

## 2025-03-01 RX ADMIN — SODIUM CHLORIDE, SODIUM LACTATE, POTASSIUM CHLORIDE, CALCIUM CHLORIDE 125 ML/HR: 20; 30; 600; 310 INJECTION, SOLUTION INTRAVENOUS at 15:25

## 2025-03-01 RX ADMIN — LIDOCAINE HYDROCHLORIDE 5 ML: 20 INJECTION, SOLUTION EPIDURAL; INFILTRATION; INTRACAUDAL; PERINEURAL at 15:05

## 2025-03-01 RX ADMIN — ONDANSETRON 4 MG: 2 INJECTION INTRAMUSCULAR; INTRAVENOUS at 20:35

## 2025-03-01 RX ADMIN — LIDOCAINE HYDROCHLORIDE,EPINEPHRINE BITARTRATE 2 ML: 20; .005 INJECTION, SOLUTION EPIDURAL; INFILTRATION; INTRACAUDAL; PERINEURAL at 16:22

## 2025-03-01 RX ADMIN — LIDOCAINE HYDROCHLORIDE AND EPINEPHRINE 3 ML: 15; 5 INJECTION, SOLUTION EPIDURAL at 15:00

## 2025-03-01 RX ADMIN — MISOPROSTOL 800 MCG: 200 TABLET ORAL at 20:37

## 2025-03-01 RX ADMIN — SODIUM CHLORIDE 2.5 MILLION UNITS: 9 INJECTION, SOLUTION INTRAVENOUS at 15:20

## 2025-03-01 NOTE — H&P
PINEDA Mann  Obstetric History and Physical    Chief Complaint   Patient presents with    Scheduled Induction       Subjective     Patient is a 29 y.o. female  currently at 39w2d, who presents for elective induction of labor.    Her prenatal care is complicated by  GBS + and insufficient prenatal care Hep C+.  Her previous obstetric/gynecological history is noted for is non-contributory.    The following portions of the patients history were reviewed and updated as appropriate: current medications, allergies, past medical history, past surgical history, past family history, past social history, and problem list .       Prenatal Information:  Prenatal Results       Initial Prenatal Labs       Test Value Reference Range Date Time    Hemoglobin  12.2 g/dL 12.0 - 15.9 24 1414    Hematocrit  38.2 % 34.0 - 46.6 24 1414    Platelets  243 10*3/mm3 140 - 450 24 1414    Rubella IgG  <0.90 index Immune >0.99 24 1414    Hepatitis B SAg  Non-Reactive  Non-Reactive 24 1414    Hepatitis C Ab  Reactive  Non-Reactive 24 1414    RPR  Non Reactive  Non Reactive 24 1609       Non-Reactive  Non-Reactive 24 1414    T. Pallidum Ab         ABO  O   24 1414    Rh  Positive   24 1414    Antibody Screen  Negative   24 1414    HIV  Non-Reactive  Non-Reactive 24 1414    Urine Culture  <25,000 CFU/mL Normal Urogenital Mariela   24 1403    Gonorrhea  Negative  Negative 24 1403    Chlamydia  Negative  Negative 24 1403    TSH        HgB A1c         Varicella IgG        Hemoglobinopathy Fractionation  Comment   21 0944    Hemoglobinopathy (genetic testing)        Cystic fibrosis   Negative   24 1414    Spinal muscular atrophy  Negative   24 1414    Fragile X                  Fetal testing        Test Value Reference Range Date Time    NIPT        MSAFP        AFP-4                  2nd and 3rd Trimester       Test Value Reference Range Date  Time    Hemoglobin (repeated)  9.9 g/dL 12.0 - 15.9 02/20/25 1140       10.6 g/dL 12.0 - 15.9 12/04/24 1609    Hematocrit (repeated)  31.0 % 34.0 - 46.6 02/20/25 1140       32.1 % 34.0 - 46.6 12/04/24 1609    Platelets   274 10*3/mm3 140 - 450 02/20/25 1140       257 10*3/mm3 140 - 450 12/04/24 1609       243 10*3/mm3 140 - 450 08/12/24 1414    1 hour GTT   124 mg/dL 65 - 139 12/04/24 1609    Antibody Screen (repeated)        3rd TM syphilis scrn (repeated)  RPR   Non Reactive  Non Reactive 12/04/24 1609    3rd TM syphilis scrn (repeated) TP-Ab        3rd TM syphilis screen TB-Ab (FTA)        Syphilis cascade test TP-Ab (EIA)        Syphilis cascade TPPA        GTT Fasting        GTT 1 Hr        GTT 2 Hr        GTT 3 Hr        Group B Strep  Positive  Negative 02/20/25 1136              Other testing        Test Value Reference Range Date Time    Parvo IgG         CMV IgG                   Drug Screening       Test Value Reference Range Date Time    Amphetamine Screen        Barbiturate Screen  Negative  Negative 08/12/24 1403    Benzodiazepine Screen  Negative  Negative 08/12/24 1403    Methadone Screen  Negative  Negative 08/12/24 1403    Phencyclidine Screen        Opiates Screen  Negative  Negative 08/12/24 1403    THC Screen  Negative  Negative 08/12/24 1403    Cocaine Screen  Negative  Negative 08/12/24 1403    Propoxyphene Screen        Buprenorphine Screen        Methamphetamine Screen        Oxycodone Screen  Negative  Negative 08/12/24 1403    Tricyclic Antidepressants Screen                  Legend    ^: Historical                          External Prenatal Results       Pregnancy Outside Results - Transcribed From Office Records - See Scanned Records For Details       Test Value Date Time    ABO  O  08/12/24 1414    Rh  Positive  08/12/24 1414    Antibody Screen  Negative  08/12/24 1414    Varicella IgG       Rubella  <0.90 index 08/12/24 1414    Hgb  9.9 g/dL 02/20/25 1140       10.6 g/dL 12/04/24 1609        12.2 g/dL 24 1414    Hct  31.0 % 25 1140       32.1 % 24 1609       38.2 % 24 1414    HgB A1c        1h GTT  124 mg/dL 24 1609    3h GTT Fasting       3h GTT 1 hour       3h GTT 2 hour       3h GTT 3 hour        Gonorrhea (discrete)  Negative  24 1403    Chlamydia (discrete)  Negative  24 1403    RPR  Non Reactive  24 1609       Non-Reactive  24 1414    Syphils cascade: TP-Ab (FTA)       TP-Ab       TP-Ab (EIA)       TPPA       HBsAg  Non-Reactive  24 1414    Herpes Simplex Virus PCR       Herpes Simplex VIrus Culture       HIV  Non-Reactive  24 1414    Hep C RNA Quant PCR       Hep C Antibody  Reactive  24 1414    AFP       NIPT       Cystic Fibrosis (Home)  Negative  24 1414    Cystic Fibroisis        Spinal Muscular atrophy  Negative  24 1414    Fragile X       Group B Strep  Positive  25 1136    GBS Susceptibility to Clindamycin       GBS Susceptibility to Erythromycin       Fetal Fibronectin       Genetic Testing, Maternal Blood                 Drug Screening       Test Value Date Time    Urine Drug Screen       Amphetamine Screen       Barbiturate Screen  Negative  24 1403    Benzodiazepine Screen  Negative  24 1403    Methadone Screen  Negative  24 1403    Phencyclidine Screen       Opiates Screen  Negative  24 1403    THC Screen  Negative  24 1403    Cocaine Screen       Propoxyphene Screen       Buprenorphine Screen       Methamphetamine Screen       Oxycodone Screen  Negative  24 1403    Tricyclic Antidepressants Screen                 Legend    ^: Historical                             Past OB History:     OB History    Para Term  AB Living   6 4 4 0 1 4   SAB IAB Ectopic Molar Multiple Live Births   1 0 0 0 0 4      # Outcome Date GA Lbr Isai/2nd Weight Sex Type Anes PTL Lv   6 Current            5 SAB 2024 8w0d    SAB      4 Term 22 39w1d 8768:56 /  00:14 3035 g (6 lb 11.1 oz) M Vag-Spont EPI N JOSÉ MIGUEL      Name: ANAI SANTIAGO      Apgar1: 8  Apgar5: 9   3 Term 11/09/19 38w6d  2863 g (6 lb 5 oz) F Vag-Spont   JOSÉ MIGUEL      Complications: Postpartum Hemorrhage   2 Term 10/25/18 41w0d  3033 g (6 lb 11 oz) M Vag-Spont      1 Term 12/25/17 38w4d  3118 g (6 lb 14 oz) F Vag-Spont   JOSÉ MIGUEL      Birth Comments: No prenatal care      Obstetric Comments   MENARCHE AT 12 REGULAR 7 DAYS       Past Medical History: Past Medical History:   Diagnosis Date    Cholelithiasis     Chronic hepatitis C complicating pregnancy, antepartum 05/05/2022 5/5/2022 Quantitative values qtrimester; referral to GI [facility] for treatment     Constipation, unspecified     Hepatitis C     ON MEDS- ON CURRENT PROBLEMS    History of blood transfusion [facility], G3     Hx of chlamydia infection     L4-L5 disc bulge 04/07/2015    Miscarriage     Feb. 2024    Other specified noninflammatory disorders of vagina     Unspecified viral hepatitis C without hepatic coma       Past Surgical History Past Surgical History:   Procedure Laterality Date    CHOLECYSTECTOMY N/A 03/29/2023    Procedure: CHOLECYSTECTOMY LAPAROSCOPIC;  Surgeon: Lai Miller MD;  Location: [facility] MAIN OR;  Service: General;  Laterality: N/A;    EYE SURGERY Bilateral     x2    HERNIA REPAIR  2009    Umbilical, mesh    WISDOM TOOTH EXTRACTION        Family History: Family History   Problem Relation Age of Onset    Breast cancer Mother     Ovarian cancer Mother     Breast cancer Maternal Aunt     Thyroid disease Maternal Grandmother     Heart disease Maternal Grandmother     Diabetes Maternal Grandmother     Uterine cancer Neg Hx     Colon cancer Neg Hx     Melanoma Neg Hx     Clotting disorder Neg Hx     Alcohol abuse Neg Hx     Arthritis Neg Hx     Asthma Neg Hx     Birth defects Neg Hx     Cancer Neg Hx     COPD Neg Hx     Bleeding Disorder Neg Hx     Depression Neg Hx     Drug abuse Neg Hx     Early death Neg Hx     Hearing loss  Neg Hx     Hyperlipidemia Neg Hx     Hypertension Neg Hx     Kidney disease Neg Hx     Learning disabilities Neg Hx     Malig Hyperthermia Neg Hx     Mental illness Neg Hx     Mental retardation Neg Hx     Miscarriages / Stillbirths Neg Hx       Social History:  reports that she has never smoked. She has never been exposed to tobacco smoke. She has never used smokeless tobacco.   reports no history of alcohol use.   reports no history of drug use.        General ROS: Pertinent items are noted in HPI    Objective       Vital Signs Range for the last 24 hours  Temperature: Temp:  [97.7 °F (36.5 °C)] 97.7 °F (36.5 °C)   Temp Source: Temp src: Oral   BP: BP: (111)/(71) 111/71   Pulse: Heart Rate:  [108] 108   Respirations: Resp:  [18] 18   SPO2:     O2 Amount (l/min):     O2 Devices     Weight:       Physical Examination: General appearance - alert, well appearing, and in no distress  Mental status - alert, oriented to person, place, and time  Chest - no labored breathing  Abdomen - soft, nontender, nondistended, gravid  Extremities - peripheral pulses normal, no pedal edema      Presentation: Vertex   Cervix: Exam by:     Dilation:     Effacement:     Station:         Fetal Heart Rate Assessment   Method:     Beats/min:     Baseline:     Variability:     Accels:     Decels:     Tracing Category:       Uterine Assessment   Method:     Frequency (min):     Ctx Count in 10 min:     Duration:     Intensity:                 Bucklin Units:       GBS is positive      Assessment & Plan       Encounter for induction of labor        Assessment:  1.  Intrauterine pregnancy at 39w2d gestation with reactive, reassuring fetal status.    2.  induction of labor  for elective  with favorable cervix  3.  Obstetrical history significant for is non-contributory.  4.  GBS status:   Group B Strep, DNA   Date Value Ref Range Status   02/20/2025 Positive (A) Negative Final       Plan:  1. Vaginal anticipated, fetal and uterine monitoring   continuously, labor augmentation  Pitocin, and antibiotic for GBS  2. Plan of care has been reviewed with patient and patient agrees.   3.  Risks, benefits of treatment plan have been discussed.  4.  All questions have been answered.        Counseling:The patient was counseled on the risks, benefits and alternatives of Induction.  Risks reviewed, but are not limited to: bleeding, transfusion, fetal intolerance,  (possibly emergent),  and uterine rupture.  She declines expectant management or  and desires induction.  Reviewed risks w prematurity.  All her questions have been answered to her satisfaction and she desires to proceed.  Specifically with Cytotec, she understands that it is endorsed by the [employer], but not FDA approved for the induction of labor.      Cynthia Dasilva DO  3/1/2025  11:27 EST

## 2025-03-01 NOTE — ANESTHESIA PROCEDURE NOTES
Labor Epidural    Pre-sedation assessment completed: 3/1/2025 2:53 PM    Patient reassessed immediately prior to procedure    Patient location during procedure: OB  Start Time: 3/1/2025 2:55 PM  Stop Time: 3/1/2025 3:05 PM  Performed By  CRNA/CAA: Roslyn Graham CRNA  Preanesthetic Checklist  Completed: patient identified, IV checked, risks and benefits discussed, surgical consent, monitors and equipment checked, pre-op evaluation and timeout performed  Additional Notes  Epidural placed with ease one redirection.    After negative test dose patient given loading dose. Pt  and SBP 77. Patient states her entire body feels heavy and left side of face feels numb. Patient given nghia and ephedrine. After BP and HR return to baseline with in a few   minutes after medications she states her body is no longer heavy and face is no longer numb. Her legs are still heavy but she can move them. I started the epidural pump at 6mL/hr.     At 1530 patient states bottom lip and tip of tongue is numb. Legs are heavy. Arms 5/5 strength and no weakness per patient. No difficulty breathing, SOB, or chest heaviness. Patient given another dose of nghia and epidural pump off a this time. VSS.   Prep:  Pt Position:sitting  Sterile Tech:cap, gloves, sterile barrier, mask and gown  Prep:povidone-iodine 7.5% surgical scrub  Monitoring:blood pressure monitoring, continuous pulse oximetry and EKG  Epidural Block Procedure:  Approach:midline  Guidance:landmark technique and palpation technique  Location:L3-L4  Needle Type:Tuohy  Needle Gauge:17 G  Loss of Resistance Medium: air  Loss of Resistance: 6cm  Cath Depth at skin:11 cm  Paresthesia: none  Aspiration:negative  Test Dose:negative  Medication: lidocaine 1.5%-EPINEPHrine 1:200,000 (XYLOCAINE W/EPI) injection - Epidural   3 mL - 3/1/2025 3:00:00 PM   2 mL - 3/1/2025 3:05:00 PM  lidocaine PF 2% (XYLOCAINE) injection - Epidural   5 mL - 3/1/2025 3:05:00 PM  fentaNYL citrate (PF) (SUBLIMAZE)  injection - Epidural   100 mcg - 3/1/2025 3:05:00 PM  Number of Attempts: 1  Post Assessment:  Dressing:occlusive dressing applied and secured with tape  Pt Tolerance:patient tolerated the procedure well with no apparent complications  Complications:no
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
## 2025-03-01 NOTE — PROGRESS NOTES
OB Intrapartum Note    Subjective: No complaints, Comfortable with epidural    Objective:  Baseline: Normal 110-160 bpm  Variability:   Moderate/Normal (amplitude 6-25 bpm)  Accelerations: Present (32 weeks+) 15 x 15 bpm  Decelerations: None  Contractions:  Regular, q2min, q3min    Cervical exam:    Dilation: 5cm    Effacement: 70%    Station: -3    Impression:    Category I  Reassuring fetus, AROM, Clear fluid    Plan:   Continue pitocin  Active labor positioning  Pelvis feels clinically adequate  Reviewed course/progress/plan with pt.  All questions answered to pts satisfaction.  Pt desires to proceed as outlined.  I have discussed in detail with patient the current plan to include, but NLT, appropriate expectations for labor and delivery, to include possible length of time expected for delivery and hospital stay.  All questions have been answered to pts satisfaction and pt desires to proceed as noted.        Electronically signed by Cynthia Dasilva DO, 03/01/25, 4:40 PM EST.

## 2025-03-02 LAB
FERRITIN SERPL-MCNC: 11.01 NG/ML (ref 13–150)
IRON 24H UR-MRATE: 89 MCG/DL (ref 37–145)
IRON SATN MFR SERPL: 16 % (ref 20–50)
TIBC SERPL-MCNC: 557 MCG/DL (ref 298–536)
TRANSFERRIN SERPL-MCNC: 374 MG/DL (ref 200–360)
WHOLE BLOOD HOLD SPECIMEN: NORMAL

## 2025-03-02 PROCEDURE — 82728 ASSAY OF FERRITIN: CPT | Performed by: STUDENT IN AN ORGANIZED HEALTH CARE EDUCATION/TRAINING PROGRAM

## 2025-03-02 PROCEDURE — 84466 ASSAY OF TRANSFERRIN: CPT | Performed by: STUDENT IN AN ORGANIZED HEALTH CARE EDUCATION/TRAINING PROGRAM

## 2025-03-02 PROCEDURE — 25810000003 SODIUM CHLORIDE 0.9 % SOLUTION: Performed by: STUDENT IN AN ORGANIZED HEALTH CARE EDUCATION/TRAINING PROGRAM

## 2025-03-02 PROCEDURE — 83540 ASSAY OF IRON: CPT | Performed by: STUDENT IN AN ORGANIZED HEALTH CARE EDUCATION/TRAINING PROGRAM

## 2025-03-02 PROCEDURE — 25010000002 NA FERRIC GLUC CPLX PER 12.5 MG: Performed by: STUDENT IN AN ORGANIZED HEALTH CARE EDUCATION/TRAINING PROGRAM

## 2025-03-02 RX ORDER — PROMETHAZINE HYDROCHLORIDE 25 MG/1
12.5 TABLET ORAL EVERY 4 HOURS PRN
Status: DISCONTINUED | OUTPATIENT
Start: 2025-03-02 | End: 2025-03-03 | Stop reason: HOSPADM

## 2025-03-02 RX ORDER — ONDANSETRON 4 MG/1
4 TABLET, ORALLY DISINTEGRATING ORAL EVERY 8 HOURS PRN
Status: DISCONTINUED | OUTPATIENT
Start: 2025-03-02 | End: 2025-03-03 | Stop reason: HOSPADM

## 2025-03-02 RX ORDER — OXYTOCIN/0.9 % SODIUM CHLORIDE 30/500 ML
125 PLASTIC BAG, INJECTION (ML) INTRAVENOUS ONCE AS NEEDED
Status: DISCONTINUED | OUTPATIENT
Start: 2025-03-02 | End: 2025-03-03 | Stop reason: HOSPADM

## 2025-03-02 RX ORDER — CALCIUM CARBONATE 500 MG/1
2 TABLET, CHEWABLE ORAL 3 TIMES DAILY PRN
Status: DISCONTINUED | OUTPATIENT
Start: 2025-03-02 | End: 2025-03-03 | Stop reason: HOSPADM

## 2025-03-02 RX ORDER — ACETAMINOPHEN 325 MG/1
650 TABLET ORAL EVERY 6 HOURS PRN
Status: DISCONTINUED | OUTPATIENT
Start: 2025-03-02 | End: 2025-03-03 | Stop reason: HOSPADM

## 2025-03-02 RX ORDER — DOCUSATE SODIUM 100 MG/1
100 CAPSULE, LIQUID FILLED ORAL DAILY
Status: DISCONTINUED | OUTPATIENT
Start: 2025-03-02 | End: 2025-03-03 | Stop reason: HOSPADM

## 2025-03-02 RX ORDER — SODIUM CHLORIDE 0.9 % (FLUSH) 0.9 %
1-10 SYRINGE (ML) INJECTION AS NEEDED
Status: DISCONTINUED | OUTPATIENT
Start: 2025-03-02 | End: 2025-03-03 | Stop reason: HOSPADM

## 2025-03-02 RX ORDER — IBUPROFEN 800 MG/1
800 TABLET, FILM COATED ORAL EVERY 8 HOURS PRN
Qty: 30 TABLET | Refills: 1 | Status: SHIPPED | OUTPATIENT
Start: 2025-03-02

## 2025-03-02 RX ORDER — IBUPROFEN 600 MG/1
600 TABLET, FILM COATED ORAL EVERY 6 HOURS PRN
Status: DISCONTINUED | OUTPATIENT
Start: 2025-03-02 | End: 2025-03-03 | Stop reason: HOSPADM

## 2025-03-02 RX ORDER — DIPHENOXYLATE HYDROCHLORIDE AND ATROPINE SULFATE 2.5; .025 MG/1; MG/1
1 TABLET ORAL EVERY 6 HOURS PRN
Status: DISCONTINUED | OUTPATIENT
Start: 2025-03-02 | End: 2025-03-03 | Stop reason: HOSPADM

## 2025-03-02 RX ORDER — BISACODYL 10 MG
10 SUPPOSITORY, RECTAL RECTAL DAILY PRN
Status: DISCONTINUED | OUTPATIENT
Start: 2025-03-02 | End: 2025-03-03 | Stop reason: HOSPADM

## 2025-03-02 RX ADMIN — SODIUM CHLORIDE 250 MG: 9 INJECTION, SOLUTION INTRAVENOUS at 10:39

## 2025-03-02 RX ADMIN — IBUPROFEN 600 MG: 600 TABLET, FILM COATED ORAL at 04:12

## 2025-03-02 RX ADMIN — ACETAMINOPHEN 650 MG: 325 TABLET ORAL at 18:41

## 2025-03-02 RX ADMIN — IBUPROFEN 600 MG: 600 TABLET, FILM COATED ORAL at 23:29

## 2025-03-02 RX ADMIN — DOCUSATE SODIUM 100 MG: 100 CAPSULE, LIQUID FILLED ORAL at 08:44

## 2025-03-02 RX ADMIN — ACETAMINOPHEN 650 MG: 325 TABLET ORAL at 12:36

## 2025-03-02 RX ADMIN — IBUPROFEN 600 MG: 600 TABLET, FILM COATED ORAL at 16:53

## 2025-03-02 NOTE — PLAN OF CARE
Problem: Adult Inpatient Plan of Care  Goal: Plan of Care Review  Outcome: Progressing  Goal: Patient-Specific Goal (Individualized)  Outcome: Progressing  Goal: Absence of Hospital-Acquired Illness or Injury  Outcome: Progressing  Intervention: Identify and Manage Fall Risk  Recent Flowsheet Documentation  Taken 3/1/2025 2000 by Chantel Valentin RN  Safety Promotion/Fall Prevention:   safety round/check completed   room organization consistent  Goal: Optimal Comfort and Wellbeing  Outcome: Progressing  Intervention: Provide Person-Centered Care  Recent Flowsheet Documentation  Taken 3/1/2025 2000 by Chantel Valentin RN  Trust Relationship/Rapport:   care explained   choices provided   emotional support provided   empathic listening provided   questions answered   questions encouraged   reassurance provided   thoughts/feelings acknowledged  Goal: Readiness for Transition of Care  Outcome: Progressing     Problem: Postpartum (Vaginal Delivery)  Goal: Successful Parent Role Transition  Outcome: Progressing  Goal: Hemostasis  Outcome: Progressing  Goal: Absence of Infection Signs and Symptoms  Outcome: Progressing  Goal: Anesthesia/Sedation Recovery  Outcome: Progressing  Intervention: Optimize Anesthesia Recovery  Recent Flowsheet Documentation  Taken 3/1/2025 2000 by Chantel Valentin RN  Safety Promotion/Fall Prevention:   safety round/check completed   room organization consistent  Goal: Optimal Pain Control and Function  Outcome: Progressing  Intervention: Prevent or Manage Pain  Recent Flowsheet Documentation  Taken 3/2/2025 0000 by Chantel Valentin RN  Perineal Care: (mesh undies applied)   medicated pads applied   perineal hygiene encouraged   perineal spray bottle/warm water use encouraged   perineum cleansed   topical anesthetic preparation applied  Taken 3/1/2025 2000 by Chantel Valentin RN  Perineal Care: perineal hygiene encouraged  Goal: Effective Urinary  Elimination  Outcome: Progressing   Goal Outcome Evaluation:

## 2025-03-02 NOTE — PROGRESS NOTES
Postpartum Progress Note     PPD# 1    Benita KEEN is a 29 y.o.  who is postpartum day #1. Patient is doing well and denies any complaints. Patient states pain is controlled but feels sore.  Patient states lochia is moderate.  Patient is breastfeed, bottle feed      Vitals:    25 0530   BP: 111/66   Pulse: 73   Resp: 16   Temp: 98.4 °F (36.9 °C)   SpO2:          Physical Exam:  HEENT:Normocephalic and atraumatic, NAD  Lungs: No labored breathing  Abdomen: Soft, appropriate tenderness to palpation, Uterine fundus firm and below the umbilicus  Extremities: Negative swelling or cyanosis, negative clonus    Recent Results (from the past 24 hours)   CBC (No Diff)    Collection Time: 25 11:28 AM    Specimen: Blood   Result Value Ref Range    WBC 6.38 3.40 - 10.80 10*3/mm3    RBC 3.66 (L) 3.77 - 5.28 10*6/mm3    Hemoglobin 9.2 (L) 12.0 - 15.9 g/dL    Hematocrit 29.8 (L) 34.0 - 46.6 %    MCV 81.4 79.0 - 97.0 fL    MCH 25.1 (L) 26.6 - 33.0 pg    MCHC 30.9 (L) 31.5 - 35.7 g/dL    RDW 14.6 12.3 - 15.4 %    RDW-SD 43.4 37.0 - 54.0 fl    MPV 10.4 6.0 - 12.0 fL    Platelets 205 140 - 450 10*3/mm3   Treponema pallidum AB w/Reflex RPR    Collection Time: 25 11:28 AM    Specimen: Blood   Result Value Ref Range    Treponemal AB Total Non-Reactive Non-Reactive   Urine Drug Screen - Urine, Clean Catch    Collection Time: 25 11:28 AM    Specimen: Urine, Clean Catch   Result Value Ref Range    THC, Screen, Urine Negative Negative    Phencyclidine (PCP), Urine Negative Negative    Cocaine Screen, Urine Negative Negative    Methamphetamine, Ur Negative Negative    Opiate Screen Negative Negative    Amphetamine Screen, Urine Negative Negative    Benzodiazepine Screen, Urine Negative Negative    Tricyclic Antidepressants Screen Negative Negative    Methadone Screen, Urine Negative Negative    Barbiturates Screen, Urine Negative Negative    Oxycodone Screen, Urine Negative Negative    Buprenorphine,  Screen, Urine Negative Negative   Type & Screen    Collection Time: 25 11:28 AM    Specimen: Blood   Result Value Ref Range    ABO Type O     RH type Positive     Antibody Screen Negative     T&S Expiration Date 3/4/2025 11:59:59 PM    Fentanyl, Urine - Urine, Clean Catch    Collection Time: 25 11:28 AM    Specimen: Urine, Clean Catch   Result Value Ref Range    Fentanyl, Urine Negative Negative   Blood Gas, Arterial, Cord    Collection Time: 25  8:40 PM    Specimen: Umbilical Cord; Cord Blood Arterial   Result Value Ref Range    pH, Cord Arterial 7.17 (L) 7.18 - 7.34 pH Units    pCO2, Cord Arterial 48.2 33.0 - 49.0 mmHg    pO2, Cord Arterial 22.9 mmHg    HCO3, Cord Arterial 17.7 mmol/L    Base Exc, Cord Arterial -10.9 (L) -2.0 - 2.0 mmol/L    O2 Sat, Cord Arterial 27.3 %    Barometric Pressure for Blood Gas 746.8000 mmHg   Blood Gas, Venous, Cord    Collection Time: 25  8:40 PM    Specimen: Umbilical Cord; Cord Blood Venous   Result Value Ref Range    pH, Cord Venous 7.282 7.260 - 7.400 pH Units    pCO2, Cord Venous 36.0 35.0 - 51.3 mm Hg    pO2, Cord Venous 27.8 19.0 - 39.0 mm Hg    HCO3, Cord Venous 17.0 mmol/L    Base Excess, Cord Venous -8.8 -30.0 - 30.0 mmol/L    O2 Sat, Cord Venous 45.3 %    Barometric Pressure for Blood Gas 745.6000 mmHg   Hemoglobin & Hematocrit, Blood    Collection Time: 25 10:54 PM    Specimen: Hand, Right; Blood   Result Value Ref Range    Hemoglobin 9.2 (L) 12.0 - 15.9 g/dL    Hematocrit 30.2 (L) 34.0 - 46.6 %   ]    ASSESSMENT/PLAN:    Patient is a 29 y.o.female who is PPD# 1 s/p    -Rh pos/Rubella immune   -Encourage ambulation    -breastfeed, bottle feed, lactation referral   -Patient denies any complaints, continue postpartum care    - IV iron ordered       Cynthia Dasilva DO  3/2/2025 10:06 EST

## 2025-03-02 NOTE — L&D DELIVERY NOTE
VAGINAL DELIVERY NOTE          Delivery Date: 3/1/2025  Delivery Time: 8:11 PM  Delivery Type: Spontaneous vaginal delivery  Gestational Age: 39w2d  Delivery Provider: Cynthia Dasilva       Encounter for induction of labor       Anesthesia: Epidural    Infant: female infant   3335 g (7 lb 5.6 oz)  APGARS: 7  @ 1 minute / 9  @ 5 minutes  Shoulder Dystocia: No      Placenta, Cord, and Fluid    Placenta Delivered:  Spontaneous at   3/1/2025  8:18 PM    Cord: 3 vessels present.   Nuchal Cord:  no   Cord Blood Obtained: Yes   Cord Gases Obtained:  Yes   Cord Gas Results: Venous:    pH, Cord Venous   Date Value Ref Range Status   2025 7.282 7.260 - 7.400 pH Units Final     Base Excess, Cord Venous   Date Value Ref Range Status   2025 -8.8 -30.0 - 30.0 mmol/L Final     Comment:     Serial Number: [id number]:  [id number]       Arterial:    pH, Cord Arterial   Date Value Ref Range Status   2025 7.17 (L) 7.18 - 7.34 pH Units Final     Base Exc, Cord Arterial   Date Value Ref Range Status   2025 -10.9 (L) -2.0 - 2.0 mmol/L Final     Comment:     Serial Number: [id number]  [id number]          Perineum: OBPERINEUM: Intact    EBL: Est. Blood Loss (mL): 600 mL (Filed from Delivery Summary) (25)    Complications: None    Description of Procedure:   The patient is a -0-1-4 at 39 weeks 2 days gestation who presented to L&D for elective induction of labor. The patient progressed to complete dilation and pushed with epidural anesthesia in the lithotomy position to deliver a live female infant in the ZAYRA position. There was no nuchal cord, and the amniotic fluid was meconium stained. Atraumatic delivery of the shoulders, and body occurred in conjunction with maternal pushing efforts. The infant was placed on the patient's abdomen after delivery and bulb-suctioned as needed.  Vigorous cry noted.  The umbilical cord was clamped and cut and cord gases were collected. The intact placenta was delivered  with manual massage of the uterine fundus. Uterine bleeding was controlled with fundal massage and oxytocin.  No lacerations were noted.  Brisk bleeding was noted from the uterus.  I performed a bimanual exam and removed clots from the lower uterine segment.  I called for Methergine to be given.  I continued fundal massage.  I was able to inspect the cervix and there were no lacerations.  Bleeding continued.  I called for Hemabate and TXA to be given.  Blood pressure was stable but patient was tachycardic but that has been her baseline.  I also called for the Agatha device.  I placed the Lana device and it appeared to be working.  However patient started vomiting and with the Valsalva the device was pushed out.  I removed it completely.  I called for Dr. Michael to come to bedside.  At this time, the bleeding had went to scant.  Firm fundus was noted.  Decision was made to not place the Lana back.  I placed 800 mcg of Cytotec rectally.  Vitals remained stable.  I called for a second IV to start IV fluids.  Pitocin is still running in a second bag will be started.  Bleeding remains scant and fundus firm.  Will continue to monitor.  The vagina was explored with no retained sponges or foreign bodies noted.     Electronically signed by Cynthia Dasilva DO, 03/01/25, 8:51 PM EST.

## 2025-03-02 NOTE — ANESTHESIA POSTPROCEDURE EVALUATION
Patient: Benita KEEN    Procedure Summary       Date: 03/01/25 Room / Location:     Anesthesia Start: 1453 Anesthesia Stop: 2011    Procedure: LABOR ANALGESIA Diagnosis:     Scheduled Providers:  Provider: Roslyn Graham CRNA    Anesthesia Type: epidural ASA Status: 2            Anesthesia Type: epidural    Vitals  Vitals Value Taken Time   /59 03/02/25 1009   Temp 36.7 °C (98.1 °F) 03/02/25 1009   Pulse 93 03/02/25 1009   Resp 16 03/02/25 1009   SpO2 97 % 03/01/25 2350   Vitals shown include unfiled device data.        Post Anesthesia Care and Evaluation    Patient location during evaluation: bedside  Patient participation: complete - patient participated  Level of consciousness: awake  Pain management: adequate    Airway patency: patent  Anesthetic complications: No anesthetic complications  PONV Status: controlled  Cardiovascular status: acceptable and stable  Respiratory status: acceptable  Hydration status: acceptable  Post Neuraxial Block status: Motor and sensory function returned to baseline and No signs or symptoms of PDPH

## 2025-03-02 NOTE — LACTATION NOTE
Mom requested to see Lactation today, at delivery she had stated to primary RN that she would just formula feed, but she has requested to start pumping now. She has a lansinoh wearable pump at the bedside and requested help with using it. LC assisted with pump use and encouraged mom to use hospital grade pump during hospital stay. LC discussed that using wearable pumps only is not ideal for establishing milk supply, but can be useful once full milk supply is established, went over good pumping guidelines and cleaning instructions. LC discussed the risk of breastfeeding infants with chronic Hepatitis C and to use caution if breasts or nipples are showing any breakdown from pumping or latching. Parents both verbalized understanding.

## 2025-03-03 ENCOUNTER — PATIENT OUTREACH (OUTPATIENT)
Dept: LABOR AND DELIVERY | Facility: HOSPITAL | Age: 30
End: 2025-03-03
Payer: COMMERCIAL

## 2025-03-03 VITALS
SYSTOLIC BLOOD PRESSURE: 121 MMHG | OXYGEN SATURATION: 97 % | TEMPERATURE: 98 F | DIASTOLIC BLOOD PRESSURE: 75 MMHG | HEART RATE: 82 BPM | RESPIRATION RATE: 20 BRPM

## 2025-03-03 RX ADMIN — DOCUSATE SODIUM 100 MG: 100 CAPSULE, LIQUID FILLED ORAL at 08:02

## 2025-03-03 NOTE — PLAN OF CARE
Problem: Adult Inpatient Plan of Care  Goal: Plan of Care Review  Outcome: Progressing  Goal: Patient-Specific Goal (Individualized)  Outcome: Progressing  Goal: Absence of Hospital-Acquired Illness or Injury  Outcome: Progressing  Intervention: Identify and Manage Fall Risk  Recent Flowsheet Documentation  Taken 3/2/2025 1653 by Kathy Esqueda RN  Safety Promotion/Fall Prevention: safety round/check completed  Taken 3/2/2025 1618 by Kathy Esqueda RN  Safety Promotion/Fall Prevention: safety round/check completed  Taken 3/2/2025 1530 by Kathy Esqueda RN  Safety Promotion/Fall Prevention: safety round/check completed  Taken 3/2/2025 1455 by Kathy Esqueda RN  Safety Promotion/Fall Prevention: safety round/check completed  Taken 3/2/2025 1330 by Kathy Esqueda RN  Safety Promotion/Fall Prevention: safety round/check completed  Taken 3/2/2025 1255 by Kathy Esqueda RN  Safety Promotion/Fall Prevention: safety round/check completed  Taken 3/2/2025 1236 by Kathy Esqueda RN  Safety Promotion/Fall Prevention: safety round/check completed  Taken 3/2/2025 1115 by Kathy Esqueda RN  Safety Promotion/Fall Prevention: safety round/check completed  Taken 3/2/2025 1039 by Kathy Esqueda RN  Safety Promotion/Fall Prevention: safety round/check completed  Taken 3/2/2025 1022 by Kathy Esqueda RN  Safety Promotion/Fall Prevention: safety round/check completed  Taken 3/2/2025 0900 by Kathy Esqueda RN  Safety Promotion/Fall Prevention: safety round/check completed  Taken 3/2/2025 0844 by Kathy Esqueda RN  Safety Promotion/Fall Prevention: safety round/check completed  Taken 3/2/2025 0730 by Kathy Esqueda RN  Safety Promotion/Fall Prevention: safety round/check completed  Intervention: Prevent Skin Injury  Recent Flowsheet Documentation  Taken 3/2/2025 1653 by Kathy Esqueda RN  Body Position: position changed independently  Taken 3/2/2025 0844 by Kathy Esqueda RN  Body Position: position changed independently  Intervention: Prevent  Infection  Recent Flowsheet Documentation  Taken 3/2/2025 0844 by Kathy Esqueda RN  Infection Prevention:   equipment surfaces disinfected   hand hygiene promoted   personal protective equipment utilized   rest/sleep promoted  Goal: Optimal Comfort and Wellbeing  Outcome: Progressing  Intervention: Monitor Pain and Promote Comfort  Recent Flowsheet Documentation  Taken 3/2/2025 1330 by Kathy Esqueda RN  Pain Management Interventions: pain management plan reviewed with patient/caregiver  Taken 3/2/2025 1236 by Kathy Esqueda RN  Pain Management Interventions:   pain medication given   pain management plan reviewed with patient/caregiver  Intervention: Provide Person-Centered Care  Recent Flowsheet Documentation  Taken 3/2/2025 0844 by Kathy Esqueda RN  Trust Relationship/Rapport:   care explained   choices provided   emotional support provided   empathic listening provided   questions answered   questions encouraged   reassurance provided   thoughts/feelings acknowledged  Goal: Readiness for Transition of Care  Outcome: Progressing     Problem: Postpartum (Vaginal Delivery)  Goal: Successful Parent Role Transition  Outcome: Progressing  Goal: Hemostasis  Outcome: Progressing  Goal: Absence of Infection Signs and Symptoms  Outcome: Progressing  Goal: Anesthesia/Sedation Recovery  Outcome: Progressing  Intervention: Optimize Anesthesia Recovery  Recent Flowsheet Documentation  Taken 3/2/2025 1653 by Kathy Esqueda RN  Safety Promotion/Fall Prevention: safety round/check completed  Taken 3/2/2025 1618 by Kathy Esqueda RN  Safety Promotion/Fall Prevention: safety round/check completed  Taken 3/2/2025 1530 by Kathy Esqueda RN  Safety Promotion/Fall Prevention: safety round/check completed  Taken 3/2/2025 1455 by Kathy Esqueda RN  Safety Promotion/Fall Prevention: safety round/check completed  Taken 3/2/2025 1330 by Kathy Esqueda RN  Safety Promotion/Fall Prevention: safety round/check completed  Taken 3/2/2025 1255 by  Swank, Kathy, RN  Safety Promotion/Fall Prevention: safety round/check completed  Taken 3/2/2025 1236 by Kathy Esqueda RN  Safety Promotion/Fall Prevention: safety round/check completed  Taken 3/2/2025 1115 by Kathy Esqueda RN  Safety Promotion/Fall Prevention: safety round/check completed  Taken 3/2/2025 1039 by Kathy Esqueda RN  Safety Promotion/Fall Prevention: safety round/check completed  Taken 3/2/2025 1022 by Kathy Esqueda RN  Safety Promotion/Fall Prevention: safety round/check completed  Taken 3/2/2025 0900 by Kathy Esqueda RN  Safety Promotion/Fall Prevention: safety round/check completed  Taken 3/2/2025 0844 by Kathy Esqueda RN  Safety Promotion/Fall Prevention: safety round/check completed  Taken 3/2/2025 0730 by Kathy Esqueda RN  Safety Promotion/Fall Prevention: safety round/check completed  Goal: Optimal Pain Control and Function  Outcome: Progressing  Intervention: Prevent or Manage Pain  Recent Flowsheet Documentation  Taken 3/2/2025 1330 by Kathy Esqueda RN  Pain Management Interventions: pain management plan reviewed with patient/caregiver  Taken 3/2/2025 1236 by Kathy Esqueda RN  Pain Management Interventions:   pain medication given   pain management plan reviewed with patient/caregiver  Goal: Effective Urinary Elimination  Outcome: Progressing  Intervention: Monitor and Manage Urinary Retention  Recent Flowsheet Documentation  Taken 3/2/2025 1330 by Kathy Esqueda RN  Urinary Elimination Promotion: frequent voiding encouraged  Taken 3/2/2025 0844 by Kathy Esqueda RN  Urinary Elimination Promotion: frequent voiding encouraged   Goal Outcome Evaluation:  Patient is progressing towards goals. Adequate intake and output noted. Minimal bleeding. Ad Harper. Pain has been controlled. Will continue to monitor.

## 2025-03-03 NOTE — PROGRESS NOTES
Postpartum Progress Note     PPD# 2    Benita KEEN is a 29 y.o.  who is postpartum day #2. Patient is doing well and denies any complaints. Patient states pain is controlled.  Patient states lochia is light.  Patient is breastfeed, bottle feed      Vitals:    25 0525   BP: 93/50   Pulse: 61   Resp: 18   Temp: 97.4 °F (36.3 °C)   SpO2:          Physical Exam:  HEENT:Normocephalic and atraumatic, NAD  Lungs: No labored breathing  Abdomen: Soft, appropriate tenderness to palpation, Uterine fundus firm and below the umbilicus  Extremities: Negative swelling or cyanosis, negative clonus    Recent Results (from the past 24 hours)   Iron Profile    Collection Time: 25 10:19 AM    Specimen: Hand, Right; Blood   Result Value Ref Range    Iron 89 37 - 145 mcg/dL    Iron Saturation (TSAT) 16 (L) 20 - 50 %    Transferrin 374 (H) 200 - 360 mg/dL    TIBC 557 (H) 298 - 536 mcg/dL   Ferritin    Collection Time: 25 10:19 AM    Specimen: Hand, Right; Blood   Result Value Ref Range    Ferritin 11.01 (L) 13.00 - 150.00 ng/mL   Lavender Top    Collection Time: 25 10:19 AM   Result Value Ref Range    Extra Tube hold for add-on    ]    ASSESSMENT/PLAN:    Patient is a 29 y.o.female who is PPD# 2 s/p    -Rh pos/Rubella immune   -Encourage ambulation    -breastfeed, bottle feed, lactation referral   -Patient denies any complaints, continue postpartum care      Stable for ME home today       Cynthia Dasilva DO  3/3/2025 09:12 EST

## 2025-03-03 NOTE — LACTATION NOTE
LN in to follow-up on breastfeeding progress. Patient states she wishes to exclusively pump. Encouraged patient to do so every 3 hours in order to encourage milk production. Discussed need to ensure nipples remain intact even when pumping in order to decrease risk of Hep C transfer to infant. Patient verbalized good understanding and reports nipples are currently intact and denied tenderness/pain. Patient is planning to discharge today. LN discussed normal infant output patterns to expect and if infant is not waking by 3 hours to wake and feed using measures shown in the hospital. Patient has a breastpump for home use and LN discussed good pumping guidelines and normal expectations with pumping and storage and preparation of ebm for feedings. LN discussed nipple care, plugged ducts, engorgement, and breast infection. LN discussed alcohol use and cigarette/second hand smoke around baby and breastfeeding and discussed the impact of street drugs on infants and breastfeeding. LN used the page in the breastfeeding guide to discuss harmful effects of these. LN discussed checking to make sure new medications are safe to breastfeed. Lactation expectations and anticipatory guidance discussed for the next two weeks. LN encouraged mom to see pediatrician within two days from discharge for follow up.  LN discussed breastfeeding/lactation resources after discharge and when to call the doctor. All questions answered. Patient verbalized good understanding.

## 2025-03-03 NOTE — PLAN OF CARE
Goal Outcome Evaluation:  Plan of Care Reviewed With: patient        Progress: improving  Outcome Evaluation: Patient assessment WNL. Up adlib. Pain has been well controlled. Bonding well with baby. D/C home this evening.

## 2025-03-03 NOTE — TELEPHONE ENCOUNTER
Left a message for the patient to let her know her results do show iron deficiency anemia and she needs to continue the iron supplementation.    HUB TO RELAY

## 2025-03-03 NOTE — DISCHARGE SUMMARY
[facility]  Delivery Discharge Summary    Patient Name: Benita KEEN  : 1995  MRN: [id number]    Date of Admission: 3/1/2025  Date of Discharge:  3/3/2025   Primary Care Physician: Cori Waldrop APRN  OB Clinician: Cynthia Dasilva DO    Procedures:  3/1/2025 - Vaginal, Spontaneous     Admitting Diagnosis:  Encounter for induction of labor [Z34.90]    Discharge Diagnosis:  Same as Admitting plus:   Pregnancy at 39w2d - Delivered     Antepartum complications: Insufficient care, chronic hepatitis C    Delivery:   Date of Delivery: 3/1/2025  Time of Delivery: 8:11 PM  Delivered By:  Cynthia Dasilva  Delivery Type: Vaginal, Spontaneous   Anesthesia: Epidural   Intrapartum complications: Postpartum Hemorrhage  Placenta: Spontaneous      Baby:  female infant;   Apgar:  7  @ 1 minute /   Apgar:  9  @ 5 minutes   Weight: 3335 g (7 lb 5.6 oz)   Length: 20    Feeding method: Breastfeeding Status: No    Discharge Details:     Discharge Medications        New Medications        Instructions Start Date   ibuprofen 800 MG tablet  Commonly known as: ADVIL,MOTRIN   800 mg, Oral, Every 8 Hours PRN             Continue These Medications        Instructions Start Date   ferrous sulfate 325 (65 FE) MG tablet   325 mg, Oral, Every Other Day      PRENATAL GUMMIES/DHA & FA PO   Take  by mouth.               Allergies   Allergen Reactions    Haemophilus Influenzae Vaccines Unknown - High Severity    Influenza Virus Vaccine [Influenza Virus Vac Live Quad] Unknown - High Severity       Discharge Disposition:   Home, self-care    Discharge Condition:  Good    Follow Up:  Future Appointments   Date Time Provider Department Center   3/5/2025 10:30 AM Cynthia Dasilva DO [facility]         Plan:  Follow up 6 weeks for postpartum appt    Electronically signed by Cynthia Dasilva DO, 25, 9:12 AM EST.

## 2025-03-03 NOTE — OUTREACH NOTE
[facility]  IP Postpartum    Questions/Answers      Flowsheet Row Responses   Best Method for Contacting Cell   Support Person Present Yes   Does the patient have a car seat at the hospital Yes   Delivery Note Reviewed Reviewed   Were birth expectations met? No   Birth Expectations Not Met Comment PPH   Is there a need for additional support/resources? No   Lactation Note Reviewed Reviewed   Is additional support needed? No   Any questions or concerns? No   Is the patient going to use Meds to Beds? No   Any concerns related discharge meds/ability to  prescriptions? No   OB Discharge Navigator Reviewed  Reviewed   Confirm Postpartum OB appointment No   Confirm initial well-child Pediatrician appointment date/time: Yes   Initial Well Child Pediatrician Appointment Date 25   Additional post-discharge F/U appointments No   Does patient have transportation to appointments? Yes   Any other assistance needed to ensure she is able to attend appointments? No   Does patient have supplies needed at home for  care? Breast Pump, Clothing, Crib, Diapers, Formula              Ana Saavedra RN  Maternity Nurse Navigator    3/3/2025, 11:17 EST

## 2025-03-10 ENCOUNTER — PATIENT OUTREACH (OUTPATIENT)
Dept: LABOR AND DELIVERY | Facility: HOSPITAL | Age: 30
End: 2025-03-10
Payer: COMMERCIAL

## 2025-03-10 ENCOUNTER — TELEPHONE (OUTPATIENT)
Dept: LACTATION | Facility: HOSPITAL | Age: 30
End: 2025-03-10
Payer: COMMERCIAL

## 2025-03-10 NOTE — TELEPHONE ENCOUNTER
MINNIE called to check with this patient and her breastfeeding progress. Patient did not answer, so MINNIE provided the lactation dept. number ([phone]) and encouraged her to call with any lactation questions or needs.

## 2025-03-10 NOTE — OUTREACH NOTE
[facility]    Postpartum EPDS sent via [employer].    Ana Saavedra RN  Maternity Nurse Navigator    3/10/2025, 10:49 EDT

## 2025-03-11 ENCOUNTER — PATIENT OUTREACH (OUTPATIENT)
Dept: LABOR AND DELIVERY | Facility: HOSPITAL | Age: 30
End: 2025-03-11
Payer: COMMERCIAL

## 2025-03-11 ENCOUNTER — TELEPHONE (OUTPATIENT)
Dept: OBSTETRICS AND GYNECOLOGY | Age: 30
End: 2025-03-11

## 2025-03-11 NOTE — OUTREACH NOTE
[facility]  Unable to Reach    Questions/Answers      Flowsheet Row Responses   Pending Outreach Postpartum Check-in   Call Attempt Second   Outcome No answer/busy, Left message   Next Call Attempt Date 03/12/25              Ana Saavedra RN  Maternity Nurse Navigator    3/11/2025, 14:24 EDT

## 2025-03-11 NOTE — TELEPHONE ENCOUNTER
Attempted to contact patient, no answer, left VM. Per Ana Acosta RN, need to get patient scheduled for an early PP visit due to EPDS score of 17 on 03/10/2025. Will send [employer] message as well.

## 2025-03-11 NOTE — OUTREACH NOTE
[facility]  Unable to Reach    Questions/Answers      Flowsheet Row Responses   Pending Outreach Postpartum Check-in   Call Attempt First   Outcome No answer/busy, Left message   Next Call Attempt Date 03/11/25            Patient does not have a follow up postpartum appointment. Office notified unable to currently reach patient and she has an elevated EPDS and no follow up    Ana Saavedra RN  Maternity Nurse Navigator    3/11/2025, 11:16 EDT

## 2025-03-14 ENCOUNTER — TELEPHONE (OUTPATIENT)
Dept: OBSTETRICS AND GYNECOLOGY | Facility: CLINIC | Age: 30
End: 2025-03-14
Payer: COMMERCIAL

## 2025-03-14 ENCOUNTER — PATIENT OUTREACH (OUTPATIENT)
Dept: LABOR AND DELIVERY | Facility: HOSPITAL | Age: 30
End: 2025-03-14
Payer: COMMERCIAL

## 2025-03-14 NOTE — TELEPHONE ENCOUNTER
Patient scheduled with Tam Parks, APRN Monday 03/17/2025 @ 11am. Attempted to call patient, no answer.

## 2025-03-14 NOTE — OUTREACH NOTE
[facility]  Postpartum Check-In    Questions/Answers      Flowsheet Row Responses   Visit Setting Telephone   Best Method for Contacting Cell   OB Discharge Note Reviewed  Reviewed   OB Discharge Navigator Reviewed  Reviewed   OB Discharge Medications Reviewed  Reviewed    discharged home with mother? Yes   Current Pain Levels 0-10 0   At Rest Pain Levels 0-10 0   Pain level with activity 0-10 0   Acceptable Pain Level 0-10 0   Verbalized Emotional State Hopelessness, Powerlessness   Family/Support Network Family, Significant Other   Level of Involvement in Care Attentive, Interactive, Supportive   Do you feel comfortable in your relationship with your baby? Yes   Have members of your household adjusted to your baby? Yes   Is the baby's father supportive and/or involved with the baby? Yes   How does your partner feel about the baby? Happy, Involved   Do you feel safe at home, school and work? Yes   Are you in a relationship with someone who threatens you or hurts you? No   Do you have the resources to keep yourself and your baby healthy and safe? Yes   Lochia (per patient report) Similar to Menstrual Flow   Number of pads per day 3   Lochia Odor None   Is patient breastfeeding? No   Postpartum Depression Screening Education Education Provided   Doctor Appointments: Education Provided   Family Planning Education Education Provided   Postpartum Care Education Education Provided   S & S to report Education Provided   Followup Appointments Made No  [OB office notified patient willing to come in Monday]   Well Child Visit Appointments Made Yes   Did you complete the visit? Yes   Were there any specific concerns? No   Umbilical Cord No reported signs or symptoms   Infant Feeding Method Formula   Formula PO (mL) 4oz   Formula/Expressed Milk frequency of feedings: q3-4h   Number of wet diapers x 24 hours 10   Last BM x 24 hours 5   What safe sleep surface is available? Pack N Play   Are there stuffed  animals, toys, pillows, quilts, blankets, wedges, positioners, bumpers or other loose bedding in the infant's sleeping environment? No   Where does the baby usually sleep? Pack N Play   Does the baby ever share a sleep surface with a sibling, adult or pet? No   Does the baby ever share a sleep surface in a bed, couch, recliner or other? No   What position do you place your baby to sleep for naps? Back   What position do you place your baby to sleep at night Back   Are you and/or other caregivers smoking inside or outside the baby's home? No   Is the infant dressed appropiately for the temperature of the home? Yes   Do you use a clean, dry pacifier that is not attached to a string or stuffed animal? Yes            Review of Systems   Psychiatric/Behavioral:  Positive for depressed mood and stress. The patient is nervous/anxious.    All other systems reviewed and are negative.    Most Recent [place]  Depression Scale Score (EPDS)    Performed by a clinician: 17 (3/14/2025  2:15 PM)    Received via [employer] questionnaire: 17 (3/10/2025)     Patient not doing well emotionally, states crying and feeling lost. Denies any current thoughts of harming self. States unable to get to a postpartum appointment due to one child has the flu and her infant has a doctors appointment Monday at 1015. She is agreeable to office scheduling appointment Monday for her since she will already be out for infants appointment. No other questions, needs or concerns.     5 Ps Screen  complete    Ana Saavedra RN  Maternity Nurse Navigator    3/14/2025, 14:15 EDT

## 2025-03-14 NOTE — OUTREACH NOTE
[facility]  Unable to Reach    Questions/Answers      Flowsheet Row Responses   Pending Outreach Postpartum Check-in   Outcome No answer/busy, Left message   Unable to reach comments: calling to give appointment time for Monday at the OB office            Left message for patient that follow up appointment is scheduled Monday at 1100 with Tam Saavedra RN  Maternity Nurse Navigator    3/14/2025, 15:00 EDT

## 2025-03-14 NOTE — TELEPHONE ENCOUNTER
Attempted to contact patient, no answer, left VM. Needed to get her scheduled for an early PP visit d/t elevated EPDS per Ana Acosta RN. Patient stated that she could come in on Monday morning. I gave her my direct number to call back so I can get her scheduled.

## 2025-03-21 ENCOUNTER — PATIENT OUTREACH (OUTPATIENT)
Dept: CALL CENTER | Facility: HOSPITAL | Age: 30
End: 2025-03-21
Payer: COMMERCIAL

## 2025-03-21 ENCOUNTER — TELEPHONE (OUTPATIENT)
Dept: OBSTETRICS AND GYNECOLOGY | Facility: CLINIC | Age: 30
End: 2025-03-21
Payer: COMMERCIAL

## 2025-03-21 NOTE — OUTREACH NOTE
Motherhood Connection Survey      Flowsheet Row Responses   [facility] facility patient discharged from? [facility]   Week 1 attempt successful? No   Unsuccessful attempts Attempt 2   Reschedule Tomorrow              Any CANTRELL - Registered Nurse

## 2025-03-21 NOTE — OUTREACH NOTE
Motherhood Connection Survey      Flowsheet Row Responses   [facility] facility patient discharged from? [facility]   Week 1 attempt successful? No   Unsuccessful attempts Attempt 1   Reschedule Today              Any CANTRELL - Registered Nurse

## 2025-03-21 NOTE — TELEPHONE ENCOUNTER
OFFICE CALLED PATIENT IN ATTEMPT TO R/S POSTPARTUM APPTMT  - AS PT NO SHOWED 3/17 ^ 3/18   - [facility] VOICEMAIL LEFT MESSAEG TO GIVE OFFIE C/B TO R/S.  ALSO, SENT [employer] MESSAGE.

## 2025-03-23 ENCOUNTER — PATIENT OUTREACH (OUTPATIENT)
Dept: CALL CENTER | Facility: HOSPITAL | Age: 30
End: 2025-03-23
Payer: COMMERCIAL

## 2025-03-23 NOTE — OUTREACH NOTE
Motherhood Connection Survey      Flowsheet Row Responses   [facility] facility patient discharged from? [facility]   Week 1 attempt successful? No   Unsuccessful attempts Attempt 3              JONATAN CRAWFORD - Registered Nurse

## 2025-03-26 NOTE — PROGRESS NOTES
POSTPARTUM Follow Up Visit      Chief Complaint   Patient presents with    Postpartum Care     PT states she hardly eats- no motivation to do anything.     HPI:    Date of delivery: 3/1/25  Delivery type:    Delivering Provider:   Dr. Cynthia Dasilva        Feeding: Bottle  Pain:  no  Vaginal Bleeding:  yes, spotting very light   Plans for BC:  Sterilization/tubal    Depressed/Anxious:  yes  EPDS score: 22  #10: 0  Feels lost, no motivation, not eating.  After prior preg used meds.  Wants to see     Weight at last OB OV:  166lb  Last pap date and result: Pap negative, HPV negative    IGP,CtNgTv,Apt HPV,rfx 16 / 18,45 (2024 14:03)    Discharge Summary by Cynthia Dasilva DO (2025 09:12)  Prenatal course complicated by insufficient care and chronic hep C.  Postpartum hemorrhage    Last seen by me in  delivery and postpartum.    PHYSICAL EXAM:  /70 (BP Location: Left arm, Patient Position: Sitting, Cuff Size: Adult)   Pulse 91   Wt 67.1 kg (148 lb)   LMP 2024   Breastfeeding No   BMI 27.07 kg/m²  4.536 kg (10 lb)  Chaperone present during breast and/or pelvic exam if performed.  General- NAD, alert and oriented, appropriate  Psych- Normal mood, good memory, good eye contact      Abdomen- Soft, non distended, non tender, no masses    External genitalia- Normal.    Urethra/Bladder/Vagina- Normal, no masses, non-tender  Prolapse : none noted    Cervix- Normal, no lesions, no discharge, no CMT  Uterus- Normal size, shape & consistency.  Non tender, mobile.  Adnexa- Normal, no mass, non-tender    Lymphatic- No palpable groin nodes  Extremities- No edema    ASSESSMENT AND PLAN:  Diagnoses and all orders for this visit:    1. Postpartum follow-up (Primary)    2. Birth control counseling  Comments:  considering sterlization    3. Request for sterilization  Overview:  Consents 25      4. Anxiety and Depression  -     escitalopram (LEXAPRO) 10 MG tablet; Take 1 tablet by mouth Daily.   Dispense: 30 tablet; Refill: 1  -     Ambulatory Referral to Behavioral Health  -     busPIRone (BUSPAR) 5 MG tablet; Take 1 tablet by mouth 3 (Three) Times a Day As Needed (anxiety).  Dispense: 30 tablet; Refill: 2      Counseling:    May resume normal activities  Core strengthening exercises reviewed and recommended  Kegel exercises reviewed and recommended  Ok to return to work/school once patient desires/maternity leave completed  Abstinence for at least 2weeks before sterilization    SSRI treatment R/B/A reviewed, black box warning SI reviewed.  At starting of med, I recommend she stop it immediately and seek care in ER, for any SI.  After she has been on medication for over I month, we reviewed the importance of NOT stopping it abruptly and weaning off, if she feels she doesn't need it anymore. Option of consideration of pharmacogenetic testing for drug efficacy with PCP/specialist and/or referral to Behavioral Health for therapy.      Follow Up:  Return in about 2 weeks (around 4/10/2025) for Discuss sterilization.          Amira Sanchez DO  03/27/2025    [facility]  [address] [place] [address]  Dept: [phone]  Dept Fax: [phone]  Loc: [phone]  Loc Fax: [phone]

## 2025-03-27 ENCOUNTER — POSTPARTUM VISIT (OUTPATIENT)
Dept: OBSTETRICS AND GYNECOLOGY | Facility: CLINIC | Age: 30
End: 2025-03-27
Payer: COMMERCIAL

## 2025-03-27 ENCOUNTER — PREP FOR SURGERY (OUTPATIENT)
Dept: OTHER | Facility: HOSPITAL | Age: 30
End: 2025-03-27
Payer: COMMERCIAL

## 2025-03-27 VITALS
BODY MASS INDEX: 27.07 KG/M2 | HEART RATE: 91 BPM | SYSTOLIC BLOOD PRESSURE: 102 MMHG | WEIGHT: 148 LBS | DIASTOLIC BLOOD PRESSURE: 70 MMHG

## 2025-03-27 DIAGNOSIS — Z30.2 REQUEST FOR STERILIZATION: ICD-10-CM

## 2025-03-27 DIAGNOSIS — Z30.09 BIRTH CONTROL COUNSELING: ICD-10-CM

## 2025-03-27 DIAGNOSIS — F41.9 ANXIETY: ICD-10-CM

## 2025-03-27 DIAGNOSIS — Z30.2 REQUEST FOR STERILIZATION: Primary | ICD-10-CM

## 2025-03-27 PROBLEM — Z34.80 SUPERVISION OF OTHER NORMAL PREGNANCY, ANTEPARTUM: Status: RESOLVED | Noted: 2024-08-12 | Resolved: 2025-03-27

## 2025-03-27 PROBLEM — O98.419 CHRONIC HEPATITIS C COMPLICATING PREGNANCY, ANTEPARTUM: Status: RESOLVED | Noted: 2024-08-13 | Resolved: 2025-03-27

## 2025-03-27 PROBLEM — B18.2 CHRONIC HEPATITIS C COMPLICATING PREGNANCY, ANTEPARTUM: Status: RESOLVED | Noted: 2024-08-13 | Resolved: 2025-03-27

## 2025-03-27 RX ORDER — ONDANSETRON 2 MG/ML
4 INJECTION INTRAMUSCULAR; INTRAVENOUS EVERY 6 HOURS PRN
OUTPATIENT
Start: 2025-03-27

## 2025-03-27 RX ORDER — SODIUM CHLORIDE 9 MG/ML
40 INJECTION, SOLUTION INTRAVENOUS AS NEEDED
OUTPATIENT
Start: 2025-03-27 | End: 2025-03-27

## 2025-03-27 RX ORDER — SODIUM CHLORIDE, SODIUM LACTATE, POTASSIUM CHLORIDE, CALCIUM CHLORIDE 600; 310; 30; 20 MG/100ML; MG/100ML; MG/100ML; MG/100ML
150 INJECTION, SOLUTION INTRAVENOUS CONTINUOUS
OUTPATIENT
Start: 2025-03-27 | End: 2025-03-27

## 2025-03-27 RX ORDER — BUSPIRONE HYDROCHLORIDE 5 MG/1
5 TABLET ORAL 3 TIMES DAILY PRN
Qty: 30 TABLET | Refills: 2 | Status: SHIPPED | OUTPATIENT
Start: 2025-03-27

## 2025-03-27 RX ORDER — SODIUM CHLORIDE 0.9 % (FLUSH) 0.9 %
10 SYRINGE (ML) INJECTION AS NEEDED
OUTPATIENT
Start: 2025-03-27

## 2025-03-27 RX ORDER — SODIUM CHLORIDE 0.9 % (FLUSH) 0.9 %
3 SYRINGE (ML) INJECTION EVERY 12 HOURS SCHEDULED
OUTPATIENT
Start: 2025-03-27

## 2025-03-27 RX ORDER — ESCITALOPRAM OXALATE 10 MG/1
10 TABLET ORAL DAILY
Qty: 30 TABLET | Refills: 1 | Status: SHIPPED | OUTPATIENT
Start: 2025-03-27

## 2025-04-08 ENCOUNTER — TELEPHONE (OUTPATIENT)
Dept: OBSTETRICS AND GYNECOLOGY | Age: 30
End: 2025-04-08
Payer: COMMERCIAL

## 2025-04-08 NOTE — TELEPHONE ENCOUNTER
UNABLE TO CONTACT / TRIED 3 TIMES 3/28/25, 3/31/25 & 4/2/25 ( SEE REFERRAL ) / LETTER MAILED TO ADDRESS ON FILE / REFERRAL TO BEHAVIORAL HEALTH

## 2025-04-11 NOTE — PROGRESS NOTES
GYN HISTORY & PHYSICAL      Patient Name: Benita KEEN  : 1995  MRN: [id number]    Subjective     Chief Complaint:   Chief Complaint   Patient presents with    Discuss Surgery     To discuss options, considering sterilization    HPI:  29 y.o.  Patient's last menstrual period was 2025 (exact date).  Social History     Substance and Sexual Activity   Sexual Activity Yes    Partners: Male    Birth control/protection: None     Progress Notes by Amira Sanchez DO (2025 11:30)  Bottlefeeding  Last intercourse over 2 weeks    Last office visit significant anxiety started on Lexapro, BuSpar and referral to behavioral health doing well    Desires permanent surgical sterilization with risk reducing procedure.    ROS: All negative except listed in HPI    Personal History     PMHx:    Past Medical History:   Diagnosis Date    Anemia     Anxiety 2022    Cholelithiasis     Constipation, unspecified     Hepatitis C     ON MEDS- ON CURRENT PROBLEMS    Hernia 2009    History of blood transfusion [place] at[place] PP, G3     Hx of chlamydia infection     L4-L5 disc bulge 2015    Miscarriage     2024       OBHx:   OB History    Para Term  AB Living   6 5 5 0 1 5   SAB IAB Ectopic Molar Multiple Live Births   1    0 5      # Outcome Date GA Lbr Isai/2nd Weight Sex Type Anes PTL Lv   6 Term 25 39w2d 08:05 / 00:14 3335 g (7 lb 5.6 oz) F Vag-Spont EPI N JOSÉ MIGUEL      Complications: Other, Immediate postpartum hemorrhage   5 SAB 2024 8w0d    SAB      4 Term 22 39w1d 8768:56 / 00:14 3035 g (6 lb 11.1 oz) M Vag-Spont EPI N JOSÉ MIGUEL   3 Term 19 38w6d  2863 g (6 lb 5 oz) F Vag-Spont   JOSÉ MIGUEL      Complications: Postpartum Hemorrhage   2 Term 10/25/18 41w0d  3033 g (6 lb 11 oz) M Vag-Spont      1 Term 17 38w4d  3118 g (6 lb 14 oz) F Vag-Spont   JOSÉ MIGUEL      Birth Comments: No prenatal care      Obstetric Comments   MENARCHE AT 12 REGULAR 7 DAYS        Medications:  busPIRone,  escitalopram, and ferrous sulfate    Allergies:  Allergies   Allergen Reactions    Haemophilus Influenzae Vaccines Unknown - High Severity    Influenza Virus Vaccine [Influenza Virus Vac Live Quad] Unknown - High Severity       PSHx:   Past Surgical History:   Procedure Laterality Date    CHOLECYSTECTOMY N/A 03/29/2023    Procedure: CHOLECYSTECTOMY LAPAROSCOPIC;  Surgeon: Lai Miller MD;  Location: [facility] MAIN OR;  Service: General;  Laterality: N/A;    EYE SURGERY Bilateral     x2    HERNIA REPAIR  2009    anterior abdominal wall above umbilicus, mesh    WISDOM TOOTH EXTRACTION         Social History:    reports that she has never smoked. She has never been exposed to tobacco smoke. She has never used smokeless tobacco. She reports that she does not drink alcohol and does not use drugs.    Family History:   Family History   Problem Relation Age of Onset    Breast cancer Mother     Ovarian cancer Mother     Thyroid disease Maternal Grandmother     Heart disease Maternal Grandmother     Diabetes Maternal Grandmother     Breast cancer Maternal Aunt     Uterine cancer Neg Hx     Colon cancer Neg Hx     Melanoma Neg Hx     Clotting disorder Neg Hx     Alcohol abuse Neg Hx     Arthritis Neg Hx     Asthma Neg Hx     Birth defects Neg Hx     Cancer Neg Hx     COPD Neg Hx     Bleeding Disorder Neg Hx     Depression Neg Hx     Drug abuse Neg Hx     Early death Neg Hx     Hearing loss Neg Hx     Hyperlipidemia Neg Hx     Hypertension Neg Hx     Kidney disease Neg Hx     Learning disabilities Neg Hx     Malig Hyperthermia Neg Hx     Mental illness Neg Hx     Mental retardation Neg Hx     Miscarriages / Stillbirths Neg Hx     Deep vein thrombosis Neg Hx     Pulmonary embolism Neg Hx        Immunizations: Non contributory to this admission        Objective     Vitals:   Heart Rate:  [77] 77  BP: (94)/(63) 94/63    PHYSICAL EXAM:   Chaperone present during breast and/or pelvic exam if performed.  General- NAD, alert and  oriented, appropriate  Psych- Normal mood, good memory  Cardiovascular- Regular rhythm, no murnurs  Respiratory- CTA to bases, no wheezes  Abdomen-see prior  Pelvic-see prior  Lymphatic-see prior  Rectal- Not examined.     Extremities- No edema, bilaterally equal      Lab/Imaging/Other: IGP,CtNgTv,Apt HPV,rfx 16 / 18,45 (08/12/2024 14:03) Pap negative, HPV negative      Assessment / Plan     Assessment:  Diagnoses and all orders for this visit:    1. Birth control counseling (Primary)    2. Anxiety  Comments:  Stable, continue meds    3. Request for sterilization  Overview:  Consents 1/28/25      4. Preoperative examination        Plan:   She declines expectant, conservative or medical management and desires surgery.  Will move forward with: Bilateral salpingectomy, possible tubal occlusion  Counseling: Risks, benefits, alternatives, side-effects, and efficacy of surgery were discussed.  Surgery risks are not limited to anesthesia, bleeding, blood transfusion, infection, damage to surrounding organs, wound separation, re-operation, thromboembolic disease, death.    See separate signed counseling note for specific surgical risks.      Return for Postop FU 4weeks.          Signature: Electronically signed by Amira Sanchez DO, 04/14/25, 7:41 PM EDT.      [facility] [address] [facility]  [address] [place] [address] [place] [address]  Dept: [phone]  Dept Fax: [phone]  Loc: [phone]  Loc Fax: [phone]

## 2025-04-14 ENCOUNTER — OFFICE VISIT (OUTPATIENT)
Dept: OBSTETRICS AND GYNECOLOGY | Age: 30
End: 2025-04-14
Payer: COMMERCIAL

## 2025-04-14 VITALS
HEART RATE: 77 BPM | WEIGHT: 151 LBS | SYSTOLIC BLOOD PRESSURE: 94 MMHG | BODY MASS INDEX: 27.62 KG/M2 | DIASTOLIC BLOOD PRESSURE: 63 MMHG

## 2025-04-14 DIAGNOSIS — Z30.09 BIRTH CONTROL COUNSELING: Primary | ICD-10-CM

## 2025-04-14 DIAGNOSIS — F41.9 ANXIETY: ICD-10-CM

## 2025-04-14 DIAGNOSIS — Z01.818 PREOPERATIVE EXAMINATION: ICD-10-CM

## 2025-04-14 DIAGNOSIS — Z30.2 REQUEST FOR STERILIZATION: ICD-10-CM

## 2025-05-05 ENCOUNTER — TELEPHONE (OUTPATIENT)
Dept: OBSTETRICS AND GYNECOLOGY | Age: 30
End: 2025-05-05

## 2025-05-05 PROCEDURE — S0260 H&P FOR SURGERY: HCPCS | Performed by: OBSTETRICS & GYNECOLOGY

## 2025-05-05 NOTE — H&P
GYN HISTORY & PHYSICAL        Patient Name: Benita KEEN  : 1995  MRN: [id number]     Subjective      Chief Complaint:       Chief Complaint   Patient presents with    Discuss Surgery      To discuss options, considering sterilization     HPI:  29 y.o.  Patient's last menstrual period was 2025 (exact date).  Social History           Substance and Sexual Activity   Sexual Activity Yes    Partners: Male    Birth control/protection: None      Progress Notes by Amira Sanchez DO (2025 11:30)  Bottlefeeding  Last intercourse over 2 weeks     Desires permanent surgical sterilization with risk reducing procedure.     ROS: All negative except listed in HPI     Personal History      PMHx:    Medical History        Past Medical History:   Diagnosis Date    Anemia      Anxiety 2022    Cholelithiasis      Constipation, unspecified      Hepatitis C       ON MEDS- ON CURRENT PROBLEMS    Hernia 2009    History of blood transfusion [place] at[place] PP, G3      Hx of chlamydia infection      L4-L5 disc bulge 2015    Miscarriage       2024            OBHx:                     OB History    Para Term  AB Living    6 5 5 0 1 5    SAB IAB Ectopic Molar Multiple Live Births    1       0 5        # Outcome Date GA Lbr Isai/2nd Weight Sex Type Anes PTL Lv   6 Term 25 39w2d 08:05 / 00:14 3335 g (7 lb 5.6 oz) F Vag-Spont EPI N JOSÉ MIGUEL      Complications: Other, Immediate postpartum hemorrhage   5 SAB 2024 8w0d       SAB         4 Term 22 39w1d 8768:56 / 00:14 3035 g (6 lb 11.1 oz) M Vag-Spont EPI N JOSÉ MIGUEL   3 Term 19 38w6d   2863 g (6 lb 5 oz) F Vag-Spont     JOSÉ MIGUEL      Complications: Postpartum Hemorrhage   2 Term 10/25/18 41w0d   3033 g (6 lb 11 oz) M Vag-Spont         1 Term 17 38w4d   3118 g (6 lb 14 oz) F Vag-Spont     JOSÉ MIGUEL      Birth Comments: No prenatal care       Obstetric Comments   MENARCHE AT 12 REGULAR 7 DAYS         Medications:  busPIRone, escitalopram,  and ferrous sulfate     Allergies:  Allergies        Allergies   Allergen Reactions    Haemophilus Influenzae Vaccines Unknown - High Severity    Influenza Virus Vaccine [Influenza Virus Vac Live Quad] Unknown - High Severity            PSHx:   Surgical History         Past Surgical History:   Procedure Laterality Date    CHOLECYSTECTOMY N/A 03/29/2023     Procedure: CHOLECYSTECTOMY LAPAROSCOPIC;  Surgeon: Lai Miller MD;  Location: [facility] OR;  Service: General;  Laterality: N/A;    EYE SURGERY Bilateral       x2    HERNIA REPAIR   2009     anterior abdominal wall above umbilicus, mesh    WISDOM TOOTH EXTRACTION                Social History:    reports that she has never smoked. She has never been exposed to tobacco smoke. She has never used smokeless tobacco. She reports that she does not drink alcohol and does not use drugs.     Family History:         Family History   Problem Relation Age of Onset    Breast cancer Mother      Ovarian cancer Mother      Thyroid disease Maternal Grandmother      Heart disease Maternal Grandmother      Diabetes Maternal Grandmother      Breast cancer Maternal Aunt      Uterine cancer Neg Hx      Colon cancer Neg Hx      Melanoma Neg Hx      Clotting disorder Neg Hx      Alcohol abuse Neg Hx      Arthritis Neg Hx      Asthma Neg Hx      Birth defects Neg Hx      Cancer Neg Hx      COPD Neg Hx      Bleeding Disorder Neg Hx      Depression Neg Hx      Drug abuse Neg Hx      Early death Neg Hx      Hearing loss Neg Hx      Hyperlipidemia Neg Hx      Hypertension Neg Hx      Kidney disease Neg Hx      Learning disabilities Neg Hx      Malig Hyperthermia Neg Hx      Mental illness Neg Hx      Mental retardation Neg Hx      Miscarriages / Stillbirths Neg Hx      Deep vein thrombosis Neg Hx      Pulmonary embolism Neg Hx           Immunizations: Non contributory to this admission           Objective      Vitals:   Heart Rate:  [77] 77  BP: (94)/(63) 94/63     PHYSICAL EXAM:        Chaperone present during breast and/or pelvic exam if performed.  General- NAD, alert and oriented, appropriate  Psych- Normal mood, good memory  Cardiovascular- Regular rhythm, no murnurs  Respiratory- CTA to bases, no wheezes  Abdomen-see prior  Pelvic-see prior  Lymphatic-see prior  Rectal- Not examined.     Extremities- No edema, bilaterally equal        Lab/Imaging/Other: IGP,CtNgTv,Apt HPV,rfx 16 / 18,45 (08/12/2024 14:03) Pap negative, HPV negative        Assessment / Plan      Assessment:  Diagnoses and all orders for this visit:     1. Birth control counseling (Primary)     2. Request for sterilization  Overview:  Consents 1/28/25        Plan:   She declines expectant, conservative or medical management and desires surgery.  Will move forward with: Bilateral salpingectomy, possible tubal occlusion  Counseling: Risks, benefits, alternatives, side-effects, and efficacy of surgery were discussed.  Surgery risks are not limited to anesthesia, bleeding, blood transfusion, infection, damage to surrounding organs, wound separation, re-operation, thromboembolic disease, death.    See separate signed counseling note for specific surgical risks.           Signature: Electronically signed by Amira Sanchez DO, 05/05/25, 4:16 PM EDT.       [facility] [address] [facility]  [address] [place] [address] [place] [address]  Dept: [phone]  Dept Fax: [phone]  Loc: [phone]  Loc Fax: [phone]

## 2025-05-05 NOTE — TELEPHONE ENCOUNTER
"  Caller: Benita KEEN \"Lalita\"    Relationship: Self    Best call back number: [phone]    What is the best time to reach you: ANY    Who are you requesting to speak with (clinical staff, provider,  specific staff member):        What was the call regarding: PT NEEDS TO KNOW SURGERY TIME FOR PROCEDURE 5/7      "

## 2025-05-05 NOTE — PRE-PROCEDURE INSTRUCTIONS
PATIENT INSTRUCTED TO BE:    - NOTHING TO EAT, DRINK OR CHEW AFTER MIDNIGHT      - TO HOLD ALL VITAMINS, SUPPLEMENTS, NSAIDS FOR ONE WEEK PRIOR TO THEIR SURGICAL PROCEDURE     - INSTRUCTED PT TO USE SURGICAL SOAP 1 TIME THE NIGHT PRIOR TO SURGERY ___5/6________ OR THE AM OF SURGERY ___5/7__________   USE THE SOAP FROM NECK TO TOES, AVOID THEIR FACE, HAIR, AND PRIVATE PARTS. IF USE THE SOAP THE NIGHT PRIOR TO SURGERY, CHANGE BED LINENS AND NO PETS IN THE BED.     INSTRUCTED NO LOTIONS, JEWELRY, PIERCINGS,  NAIL POLISH, OR DEODORANT DAY OF SURGERY    - IF DIABETIC, CHECK BLOOD GLUCOSE IF LESS THAN 70 OR HAVING SYMPTOMS CALL THE PREOP AREA FOR INSTRUCTIONS ON AM OF SURGERY ( [phone])    -INSTRUCTED TO TAKE THE FOLLOWING MEDICATIONS THE DAY OF SURGERY WITH SIPS OF WATER: BUSPAR IF NEEDED    - DO NOT BRING ANY MEDICATIONS WITH YOU TO THE HOSPITAL THE DAY OF SURGERY, EXCEPT IF USE INHALERS. BRING INHALERS DAY OF SURGERY       - BRING CPAP OR BIPAP TO THE HOSPITAL ONLY IF YOU ARE SPENDING THE NIGHT    - DO NOT SMOKE OR VAPE 24 HOURS PRIOR TO PROCEDURE PER ANESTHESIA REQUEST     -MAKE SURE YOU HAVE A RIDE HOME OR SOMEONE TO STAY WITH YOU THE DAY OF THE PROCEDURE AFTER YOU GO HOME     - FOLLOW ANY OTHER INSTRUCTIONS GIVEN TO YOU BY YOUR SURGEON'S OFFICE.     - DAY OF SURGERY ____5/7____, [facility] ( [facility]), COME TO [facility]/ [facility], [facility] FLOOR. CHECK IN AT THE DESK FOR REGISTRATION/SURGERY    - YOU WILL RECEIVE A PHONE CALL THE DAY PRIOR TO SURGERY BETWEEN 1PM AND 4 PM WITH ARRIVAL TIME, IF YOUR SURGERY IS ON A MONDAY YOU WILL RECEIVE A CALL THE FRIDAY PRIOR TO SURGERY DATE    - BRING CASH OR CREDIT CARD FOR COPAYMENT OF MEDICATIONS AFTER SURGERY IF YOU USE THE HOSPITAL PHARMACY (MEDS TO BED)    - PREADMISSION TESTING NURSE SUDEEP BEAULIEU [phone] IF HAVE ANY QUESTIONS     -PATIENT PROVIDED THE NUMBER FOR PREOP SURGICAL DEPT IF HAD QUESTIONS AFTER HOURS PRIOR TO SURGERY ( [phone]).   INFORMED PT IF NO ANSWER, LEAVE A MESSAGE AND SOMEONE WILL RETURN THEIR CALL       PATIENT VERBALIZED UNDERSTANDING

## 2025-05-05 NOTE — TELEPHONE ENCOUNTER
Informed pt that she will get a call from a nurse from [facility] on 5/6/25 after 12 Noon for the exact time of arrival for surgery on 5/7/25/mm

## 2025-05-06 DIAGNOSIS — F41.9 ANXIETY: ICD-10-CM

## 2025-05-06 RX ORDER — BUSPIRONE HYDROCHLORIDE 5 MG/1
5 TABLET ORAL 3 TIMES DAILY PRN
Qty: 90 TABLET | Refills: 2 | Status: SHIPPED | OUTPATIENT
Start: 2025-05-06

## 2025-05-06 NOTE — TELEPHONE ENCOUNTER
Patient requesting refill on Buspar 5mg. Last filled 03/27/2025 # 30 with 2 refills. Last seen 04/14/2025. Upcoming appt scheduled 05/29/2025. Please advise.

## 2025-05-07 ENCOUNTER — ANESTHESIA EVENT (OUTPATIENT)
Dept: PERIOP | Facility: HOSPITAL | Age: 30
End: 2025-05-07
Payer: COMMERCIAL

## 2025-05-07 ENCOUNTER — ANESTHESIA (OUTPATIENT)
Dept: PERIOP | Facility: HOSPITAL | Age: 30
End: 2025-05-07
Payer: COMMERCIAL

## 2025-05-07 ENCOUNTER — HOSPITAL ENCOUNTER (OUTPATIENT)
Facility: HOSPITAL | Age: 30
Discharge: HOME | End: 2025-05-07
Attending: OBSTETRICS & GYNECOLOGY | Admitting: OBSTETRICS & GYNECOLOGY
Payer: COMMERCIAL

## 2025-05-07 VITALS
SYSTOLIC BLOOD PRESSURE: 108 MMHG | DIASTOLIC BLOOD PRESSURE: 67 MMHG | HEART RATE: 59 BPM | OXYGEN SATURATION: 96 % | HEIGHT: 62 IN | WEIGHT: 146.16 LBS | RESPIRATION RATE: 17 BRPM | BODY MASS INDEX: 26.9 KG/M2 | TEMPERATURE: 97 F

## 2025-05-07 DIAGNOSIS — Z30.2 REQUEST FOR STERILIZATION: ICD-10-CM

## 2025-05-07 LAB
ABO GROUP BLD: NORMAL
BASOPHILS # BLD AUTO: 0.05 10*3/MM3 (ref 0–0.2)
BASOPHILS NFR BLD AUTO: 0.9 % (ref 0–1.5)
BLD GP AB SCN SERPL QL: NEGATIVE
DEPRECATED RDW RBC AUTO: 50.6 FL (ref 37–54)
EOSINOPHIL # BLD AUTO: 0.11 10*3/MM3 (ref 0–0.4)
EOSINOPHIL NFR BLD AUTO: 1.9 % (ref 0.3–6.2)
ERYTHROCYTE [DISTWIDTH] IN BLOOD BY AUTOMATED COUNT: 16.2 % (ref 12.3–15.4)
HCG INTACT+B SERPL-ACNC: <0.5 MIU/ML
HCT VFR BLD AUTO: 38.4 % (ref 34–46.6)
HGB BLD-MCNC: 12.3 G/DL (ref 12–15.9)
IMM GRANULOCYTES # BLD AUTO: 0.01 10*3/MM3 (ref 0–0.05)
IMM GRANULOCYTES NFR BLD AUTO: 0.2 % (ref 0–0.5)
LYMPHOCYTES # BLD AUTO: 1.84 10*3/MM3 (ref 0.7–3.1)
LYMPHOCYTES NFR BLD AUTO: 31.8 % (ref 19.6–45.3)
MCH RBC QN AUTO: 27.2 PG (ref 26.6–33)
MCHC RBC AUTO-ENTMCNC: 32 G/DL (ref 31.5–35.7)
MCV RBC AUTO: 85 FL (ref 79–97)
MONOCYTES # BLD AUTO: 0.56 10*3/MM3 (ref 0.1–0.9)
MONOCYTES NFR BLD AUTO: 9.7 % (ref 5–12)
NEUTROPHILS NFR BLD AUTO: 3.21 10*3/MM3 (ref 1.7–7)
NEUTROPHILS NFR BLD AUTO: 55.5 % (ref 42.7–76)
NRBC BLD AUTO-RTO: 0 /100 WBC (ref 0–0.2)
PLATELET # BLD AUTO: 257 10*3/MM3 (ref 140–450)
PMV BLD AUTO: 9.6 FL (ref 6–12)
RBC # BLD AUTO: 4.52 10*6/MM3 (ref 3.77–5.28)
RH BLD: POSITIVE
T&S EXPIRATION DATE: NORMAL
WBC NRBC COR # BLD AUTO: 5.78 10*3/MM3 (ref 3.4–10.8)

## 2025-05-07 PROCEDURE — 85025 COMPLETE CBC W/AUTO DIFF WBC: CPT | Performed by: OBSTETRICS & GYNECOLOGY

## 2025-05-07 PROCEDURE — 86850 RBC ANTIBODY SCREEN: CPT | Performed by: OBSTETRICS & GYNECOLOGY

## 2025-05-07 PROCEDURE — 25010000002 FENTANYL CITRATE (PF) 50 MCG/ML SOLUTION: Performed by: NURSE ANESTHETIST, CERTIFIED REGISTERED

## 2025-05-07 PROCEDURE — 25010000002 MIDAZOLAM PER 1MG: Performed by: ANESTHESIOLOGY

## 2025-05-07 PROCEDURE — 86900 BLOOD TYPING SEROLOGIC ABO: CPT | Performed by: OBSTETRICS & GYNECOLOGY

## 2025-05-07 PROCEDURE — 25010000002 KETOROLAC TROMETHAMINE PER 15 MG: Performed by: NURSE ANESTHETIST, CERTIFIED REGISTERED

## 2025-05-07 PROCEDURE — 25010000002 ONDANSETRON PER 1 MG: Performed by: NURSE ANESTHETIST, CERTIFIED REGISTERED

## 2025-05-07 PROCEDURE — 25010000002 PROPOFOL 10 MG/ML EMULSION: Performed by: NURSE ANESTHETIST, CERTIFIED REGISTERED

## 2025-05-07 PROCEDURE — 88302 TISSUE EXAM BY PATHOLOGIST: CPT | Performed by: OBSTETRICS & GYNECOLOGY

## 2025-05-07 PROCEDURE — 25010000002 SUGAMMADEX 200 MG/2ML SOLUTION: Performed by: NURSE ANESTHETIST, CERTIFIED REGISTERED

## 2025-05-07 PROCEDURE — 25010000002 DEXAMETHASONE PER 1 MG: Performed by: NURSE ANESTHETIST, CERTIFIED REGISTERED

## 2025-05-07 PROCEDURE — 25010000002 BUPIVACAINE (PF) 0.25 % SOLUTION: Performed by: OBSTETRICS & GYNECOLOGY

## 2025-05-07 PROCEDURE — 84702 CHORIONIC GONADOTROPIN TEST: CPT | Performed by: OBSTETRICS & GYNECOLOGY

## 2025-05-07 PROCEDURE — 25010000002 HYDROMORPHONE 1 MG/ML SOLUTION: Performed by: NURSE ANESTHETIST, CERTIFIED REGISTERED

## 2025-05-07 PROCEDURE — 58661 LAPAROSCOPY REMOVE ADNEXA: CPT | Performed by: OBSTETRICS & GYNECOLOGY

## 2025-05-07 PROCEDURE — 25810000003 LACTATED RINGERS PER 1000 ML: Performed by: ANESTHESIOLOGY

## 2025-05-07 PROCEDURE — 25010000002 LIDOCAINE PF 2% 2 % SOLUTION: Performed by: NURSE ANESTHETIST, CERTIFIED REGISTERED

## 2025-05-07 PROCEDURE — 86901 BLOOD TYPING SEROLOGIC RH(D): CPT | Performed by: OBSTETRICS & GYNECOLOGY

## 2025-05-07 RX ORDER — ONDANSETRON 2 MG/ML
4 INJECTION INTRAMUSCULAR; INTRAVENOUS ONCE AS NEEDED
Status: DISCONTINUED | OUTPATIENT
Start: 2025-05-07 | End: 2025-05-07 | Stop reason: HOSPADM

## 2025-05-07 RX ORDER — OXYCODONE HYDROCHLORIDE 5 MG/1
5 TABLET ORAL
Refills: 0 | Status: DISCONTINUED | OUTPATIENT
Start: 2025-05-07 | End: 2025-05-07 | Stop reason: HOSPADM

## 2025-05-07 RX ORDER — SCOPOLAMINE 1 MG/3D
1 PATCH, EXTENDED RELEASE TRANSDERMAL ONCE
Status: DISCONTINUED | OUTPATIENT
Start: 2025-05-07 | End: 2025-05-07 | Stop reason: HOSPADM

## 2025-05-07 RX ORDER — DEXMEDETOMIDINE HYDROCHLORIDE 100 UG/ML
INJECTION, SOLUTION INTRAVENOUS AS NEEDED
Status: DISCONTINUED | OUTPATIENT
Start: 2025-05-07 | End: 2025-05-07 | Stop reason: SURG

## 2025-05-07 RX ORDER — OXYCODONE HYDROCHLORIDE 5 MG/1
2.5 TABLET ORAL EVERY 4 HOURS PRN
Qty: 5 TABLET | Refills: 0 | Status: SHIPPED | OUTPATIENT
Start: 2025-05-07

## 2025-05-07 RX ORDER — ACETAMINOPHEN 325 MG/1
650 TABLET ORAL EVERY 6 HOURS PRN
Qty: 30 TABLET | Refills: 1 | Status: SHIPPED | OUTPATIENT
Start: 2025-05-07

## 2025-05-07 RX ORDER — SODIUM CHLORIDE 9 MG/ML
40 INJECTION, SOLUTION INTRAVENOUS AS NEEDED
Status: DISCONTINUED | OUTPATIENT
Start: 2025-05-07 | End: 2025-05-07 | Stop reason: HOSPADM

## 2025-05-07 RX ORDER — FENTANYL CITRATE 50 UG/ML
INJECTION, SOLUTION INTRAMUSCULAR; INTRAVENOUS AS NEEDED
Status: DISCONTINUED | OUTPATIENT
Start: 2025-05-07 | End: 2025-05-07 | Stop reason: SURG

## 2025-05-07 RX ORDER — IBUPROFEN 600 MG/1
600 TABLET, FILM COATED ORAL EVERY 6 HOURS PRN
Qty: 30 TABLET | Refills: 0 | Status: SHIPPED | OUTPATIENT
Start: 2025-05-07

## 2025-05-07 RX ORDER — ONDANSETRON 2 MG/ML
4 INJECTION INTRAMUSCULAR; INTRAVENOUS EVERY 6 HOURS PRN
Status: DISCONTINUED | OUTPATIENT
Start: 2025-05-07 | End: 2025-05-07 | Stop reason: HOSPADM

## 2025-05-07 RX ORDER — FENTANYL CITRATE 50 UG/ML
50 INJECTION, SOLUTION INTRAMUSCULAR; INTRAVENOUS
Refills: 0 | Status: DISCONTINUED | OUTPATIENT
Start: 2025-05-07 | End: 2025-05-07 | Stop reason: HOSPADM

## 2025-05-07 RX ORDER — DEXAMETHASONE SODIUM PHOSPHATE 4 MG/ML
INJECTION, SOLUTION INTRA-ARTICULAR; INTRALESIONAL; INTRAMUSCULAR; INTRAVENOUS; SOFT TISSUE AS NEEDED
Status: DISCONTINUED | OUTPATIENT
Start: 2025-05-07 | End: 2025-05-07 | Stop reason: SURG

## 2025-05-07 RX ORDER — SODIUM CHLORIDE, SODIUM LACTATE, POTASSIUM CHLORIDE, CALCIUM CHLORIDE 600; 310; 30; 20 MG/100ML; MG/100ML; MG/100ML; MG/100ML
150 INJECTION, SOLUTION INTRAVENOUS CONTINUOUS
Status: DISCONTINUED | OUTPATIENT
Start: 2025-05-07 | End: 2025-05-07 | Stop reason: HOSPADM

## 2025-05-07 RX ORDER — PROPOFOL 10 MG/ML
VIAL (ML) INTRAVENOUS AS NEEDED
Status: DISCONTINUED | OUTPATIENT
Start: 2025-05-07 | End: 2025-05-07 | Stop reason: SURG

## 2025-05-07 RX ORDER — BUPIVACAINE HYDROCHLORIDE 2.5 MG/ML
INJECTION, SOLUTION EPIDURAL; INFILTRATION; INTRACAUDAL; PERINEURAL AS NEEDED
Status: DISCONTINUED | OUTPATIENT
Start: 2025-05-07 | End: 2025-05-07 | Stop reason: HOSPADM

## 2025-05-07 RX ORDER — SODIUM CHLORIDE 0.9 % (FLUSH) 0.9 %
10 SYRINGE (ML) INJECTION AS NEEDED
Status: DISCONTINUED | OUTPATIENT
Start: 2025-05-07 | End: 2025-05-07 | Stop reason: HOSPADM

## 2025-05-07 RX ORDER — ROCURONIUM BROMIDE 10 MG/ML
INJECTION, SOLUTION INTRAVENOUS AS NEEDED
Status: DISCONTINUED | OUTPATIENT
Start: 2025-05-07 | End: 2025-05-07 | Stop reason: SURG

## 2025-05-07 RX ORDER — PROMETHAZINE HYDROCHLORIDE 25 MG/1
25 SUPPOSITORY RECTAL ONCE AS NEEDED
Status: DISCONTINUED | OUTPATIENT
Start: 2025-05-07 | End: 2025-05-07 | Stop reason: HOSPADM

## 2025-05-07 RX ORDER — LIDOCAINE HYDROCHLORIDE 20 MG/ML
INJECTION, SOLUTION EPIDURAL; INFILTRATION; INTRACAUDAL; PERINEURAL AS NEEDED
Status: DISCONTINUED | OUTPATIENT
Start: 2025-05-07 | End: 2025-05-07 | Stop reason: SURG

## 2025-05-07 RX ORDER — PROMETHAZINE HYDROCHLORIDE 12.5 MG/1
25 TABLET ORAL ONCE AS NEEDED
Status: DISCONTINUED | OUTPATIENT
Start: 2025-05-07 | End: 2025-05-07 | Stop reason: HOSPADM

## 2025-05-07 RX ORDER — SODIUM CHLORIDE 0.9 % (FLUSH) 0.9 %
3 SYRINGE (ML) INJECTION EVERY 12 HOURS SCHEDULED
Status: DISCONTINUED | OUTPATIENT
Start: 2025-05-07 | End: 2025-05-07 | Stop reason: HOSPADM

## 2025-05-07 RX ORDER — ACETAMINOPHEN 500 MG
1000 TABLET ORAL ONCE
Status: COMPLETED | OUTPATIENT
Start: 2025-05-07 | End: 2025-05-07

## 2025-05-07 RX ORDER — KETOROLAC TROMETHAMINE 15 MG/ML
INJECTION, SOLUTION INTRAMUSCULAR; INTRAVENOUS AS NEEDED
Status: DISCONTINUED | OUTPATIENT
Start: 2025-05-07 | End: 2025-05-07 | Stop reason: SURG

## 2025-05-07 RX ORDER — ONDANSETRON 2 MG/ML
INJECTION INTRAMUSCULAR; INTRAVENOUS AS NEEDED
Status: DISCONTINUED | OUTPATIENT
Start: 2025-05-07 | End: 2025-05-07 | Stop reason: SURG

## 2025-05-07 RX ORDER — SODIUM CHLORIDE, SODIUM LACTATE, POTASSIUM CHLORIDE, CALCIUM CHLORIDE 600; 310; 30; 20 MG/100ML; MG/100ML; MG/100ML; MG/100ML
9 INJECTION, SOLUTION INTRAVENOUS CONTINUOUS PRN
Status: DISCONTINUED | OUTPATIENT
Start: 2025-05-07 | End: 2025-05-07 | Stop reason: HOSPADM

## 2025-05-07 RX ORDER — MIDAZOLAM HYDROCHLORIDE 2 MG/2ML
2 INJECTION, SOLUTION INTRAMUSCULAR; INTRAVENOUS ONCE
Status: COMPLETED | OUTPATIENT
Start: 2025-05-07 | End: 2025-05-07

## 2025-05-07 RX ADMIN — HYDROMORPHONE HYDROCHLORIDE 0.5 MG: 1 INJECTION, SOLUTION INTRAMUSCULAR; INTRAVENOUS; SUBCUTANEOUS at 10:45

## 2025-05-07 RX ADMIN — ONDANSETRON 4 MG: 2 INJECTION INTRAMUSCULAR; INTRAVENOUS at 10:10

## 2025-05-07 RX ADMIN — DEXMEDETOMIDINE 5 MCG: 100 INJECTION, SOLUTION, CONCENTRATE INTRAVENOUS at 10:27

## 2025-05-07 RX ADMIN — ROCURONIUM BROMIDE 50 MG: 10 INJECTION, SOLUTION INTRAVENOUS at 09:27

## 2025-05-07 RX ADMIN — LIDOCAINE HYDROCHLORIDE 80 MG: 20 INJECTION, SOLUTION INTRAVENOUS at 09:27

## 2025-05-07 RX ADMIN — SCOLOPAMINE TRANSDERMAL SYSTEM 1 PATCH: 1 PATCH, EXTENDED RELEASE TRANSDERMAL at 07:43

## 2025-05-07 RX ADMIN — FENTANYL CITRATE 50 MCG: 50 INJECTION, SOLUTION INTRAMUSCULAR; INTRAVENOUS at 09:25

## 2025-05-07 RX ADMIN — ACETAMINOPHEN 1000 MG: 500 TABLET ORAL at 07:46

## 2025-05-07 RX ADMIN — SUGAMMADEX 150 MG: 100 INJECTION, SOLUTION INTRAVENOUS at 10:26

## 2025-05-07 RX ADMIN — OXYCODONE 5 MG: 5 TABLET ORAL at 10:45

## 2025-05-07 RX ADMIN — KETOROLAC TROMETHAMINE 30 MG: 15 INJECTION, SOLUTION INTRAMUSCULAR; INTRAVENOUS at 10:10

## 2025-05-07 RX ADMIN — PROPOFOL 150 MG: 10 INJECTION, EMULSION INTRAVENOUS at 09:27

## 2025-05-07 RX ADMIN — MIDAZOLAM HYDROCHLORIDE 2 MG: 1 INJECTION, SOLUTION INTRAMUSCULAR; INTRAVENOUS at 09:07

## 2025-05-07 RX ADMIN — SODIUM CHLORIDE, POTASSIUM CHLORIDE, SODIUM LACTATE AND CALCIUM CHLORIDE 9 ML/HR: 600; 310; 30; 20 INJECTION, SOLUTION INTRAVENOUS at 07:46

## 2025-05-07 RX ADMIN — DEXMEDETOMIDINE 10 MCG: 100 INJECTION, SOLUTION, CONCENTRATE INTRAVENOUS at 09:43

## 2025-05-07 RX ADMIN — DEXMEDETOMIDINE 10 MCG: 100 INJECTION, SOLUTION, CONCENTRATE INTRAVENOUS at 09:35

## 2025-05-07 RX ADMIN — DEXAMETHASONE SODIUM PHOSPHATE 4 MG: 4 INJECTION, SOLUTION INTRAMUSCULAR; INTRAVENOUS at 09:27

## 2025-05-07 RX ADMIN — FENTANYL CITRATE 50 MCG: 50 INJECTION, SOLUTION INTRAMUSCULAR; INTRAVENOUS at 09:31

## 2025-05-07 NOTE — OP NOTE
GYN Operative Note      Date of Service:  05/07/25     Pre-Operative Dx:   Request for sterilization [Z30.2]     Postoperative dx:   Same as above    Procedure(s):  Operative laparoscopy with bilateral salpingectomy    Surgeon:  Amira Sanchez DO    Assistant:  Julissa [facility] Assist was responsible for performing the following activities:   Held/Positioned camera, Right side of the procedure, Skin closure, Placement of dressings and their skilled assistance was necessary for the success of this case.     No resident assist available.    Anesthesia:  General Endotracheal (GETA)  Anesthesiologist: Yanique Mora MD  CRNA: Julissa Estrada CRNA    Estimated Blood Loss: 10 Mls    Findings:   Normal uterus tubes ovaries  Normal anterior and posterior cul-de-sacs.  Normal bilateral ovarian fossas  Normal appendix and liver edge.  Gallbladder not visualized     Specimens:  ID Type Source Tests Collected by Time   A : cancer risk reduction Z40.03 CODE Tissue Fallopian Tube, Left TISSUE PATHOLOGY EXAM Amira Sanchez DO 5/7/2025 1003   B : cancer risk reduction CODE Z40.03 Tissue Fallopian Tube, Right TISSUE PATHOLOGY EXAM Amira Sanchez DO 5/7/2025 0951       Procedure Details:  The patient was brought to the operating room where general anesthesia was deemed to be adequate.  She was prepped and draped in a normal sterile fashion and placed in low lithotomy position.  I was then called into the room.  Henderson catheter was placed sterilely.  An exam under anesthesia was performed.  Speculum was placed in the vagina and a single-tooth tenaculum was used to grasp the anterior lip of the cervix.  The uterus sounded to 9 centimeters.  Uterine manipulator was placed.  The single-tooth tenaculum and speculum were removed.  Outer gloves were removed and new gloves placed.  Attention was then turned to the abdomen.     All incision sites were pre-injected with local plain anesthetic.  A 5 mm incision was made in the  umbilicus.  The anterior abdominal wall was tented up.  A Veress needle was placed easily into the intra-abdominal cavity.  Intra-abdominal confirmation was noted with a positive saline drop test and low intra-abdominal pressures.  CO2 pneumoperitoneum was inflated.  An Opti-Chahca non-bladed trocar was placed intra-abdominally after visualizing all tissue planes.  Entry into the intra-abdominal cavity was confirmed.  In a similar fashion left 8 mm and right 5 mm trocars were placed under direct visualization.  A full evaluation of the abdomen and pelvis was performed with findings as above.  There was no noted damage to underlying bowel, bladder, blood vessels, or organs.      The bilateral fallopian tubes were visualized to their fimbriated ends.  The mesosalpinx was grasped and cauterized and cut.  The bilateral tubes were excised with the Ligasure [place] to the uterotubal junction.   Care was taken to avoid the infundibulopelvic ligament.  CO2 pneumoperitoneum was deflated to a pressure of 6 and no bleeding noted.  Hemostasis was assured.      CO2 pneumoperitoneum was deflated in its entirety.  The skin incisions were reapproximated in a subcuticular fashion using 4-0 Monocryl.  Steri-Strips and bandages were placed.  The uterine manipulator was removed.  Henderson catheter was removed.  Urine was clear at the end of the procedure.  Tenaculum site was noted to be hemostatic.     The patient tolerated the procedure well.  Instrument, lap, and needle counts were correct.  She was brought to the recovery in stable condition.        Complications:  None     Condition:  Good     Disposition:  PACU       Electronically signed by:  Amira Sanchez DO, 05/07/25, 10:34 AM EDT.

## 2025-05-07 NOTE — ANESTHESIA PREPROCEDURE EVALUATION
Anesthesia Evaluation     Patient summary reviewed and Nursing notes reviewed   no history of anesthetic complications:   NPO Solid Status: > 8 hours  NPO Liquid Status: > 2 hours           Airway   Mallampati: I  TM distance: >3 FB  Neck ROM: full  Dental - normal exam     Pulmonary - negative pulmonary ROS and normal exam   Cardiovascular - negative cardio ROS and normal exam  Exercise tolerance: good (4-7 METS)        Neuro/Psych  (+) psychiatric history Anxiety  GI/Hepatic/Renal/Endo    (+) hepatitis C    Musculoskeletal (-) negative ROS    Abdominal  - normal exam   Substance History - negative use     OB/GYN negative ob/gyn ROS         Other - negative ROS       ROS/Med Hx Other: PAT Nursing Notes unavailable.                     Anesthesia Plan    ASA 1     general     intravenous induction     Anesthetic plan, risks, benefits, and alternatives have been provided, discussed and informed consent has been obtained with: patient.    Plan discussed with CRNA.        CODE STATUS:

## 2025-05-07 NOTE — ANESTHESIA POSTPROCEDURE EVALUATION
Patient: Benita KEEN    Procedure Summary       Date: 05/07/25 Room / Location: [facility]  / [facility]    Anesthesia Start: 0922 Anesthesia Stop: 1038    Procedure: SALPINGECTOMY LAPAROSCOPIC, bilateral.  Using a lighted instrument to remove both fallopian tubes, possibly clamping 1 or both fallopian tubes (Bilateral: Abdomen) Diagnosis:       Request for sterilization      (Request for sterilization [Z30.2])    Surgeons: Amira Sanchez DO Provider: Yanique Mora MD    Anesthesia Type: general ASA Status: 1            Anesthesia Type: general    Vitals  Vitals Value Taken Time   /75 05/07/25 11:22   Temp 36.8 °C (98.2 °F) 05/07/25 10:35   Pulse 58 05/07/25 11:24   Resp 18 05/07/25 11:13   SpO2 98 % 05/07/25 11:24           Post Anesthesia Care and Evaluation    Patient location during evaluation: bedside  Patient participation: complete - patient participated  Level of consciousness: awake  Pain management: adequate    Airway patency: patent  PONV Status: none  Cardiovascular status: acceptable and stable  Respiratory status: acceptable  Hydration status: acceptable

## 2025-05-07 NOTE — DISCHARGE INSTRUCTIONS
DR. ROBERTS'S POST OPERATIVE GYN SURGERY PRECAUTIONS AND Answers to FAQS     NO SEX or tampons for ONE week.     NO DRIVING for ONE day and while taking narcotic pain medication.     NO TUB BATH or POOL for TWO weeks, shower only.       NO LIFTING more than 20 pounds for 1 week.     INCISION CARE:   WASH DAILY in the shower with soap and water (any type of soap is fine, it does not need to be antibacterial soap).  Look (or have a family member/friend look) at your incision EVERY DAY when you get home.  Keeping the incision/s DRY is extremely important.       If you have an incision in your belly button, use a Qtip to wash daily, then assure to dry it well.  Blow dry it if needed.     ~~~REMOVE BANDAIDS IN 24HRS.  You may SHOWER after your bandaids are removed.  ~~~REMOVE YOUR STERI STRIPS in TEN DAYS.          REDNESS, PUS, increase in PAIN, FEVER or CHILLS are all reasons to be seen in our office immediately.  Go to the ER, if it is after hours or a weekend.                             VAGINAL BLEEDING/SPOTTING may continue on and off over the next several weeks after surgery.  Pink or even a small amount of bright red in your underwear, on a liner, or on the toilet paper is normal.  You should not be needing to change a pad frequently and it should not be heavy.  If you are not sure, it is persisting, or it is increasing, please call our office.     FEVER or CHILLS or NOT FEELING WELL: call our office.  If the office is closed, you need to be seen in acute care or ER.       SWELLING/EDEMA:  should slowly improve after surgery.  Your legs/ankles should be fairly similar in size.  A red, painful, hot, swollen leg (usually just one side) can be a sign of a blood clot and should be evaluated immediately.  Call our office.  If it is after hours or a weekend, you must be seen IMMEDIATELY IN THE ER.      WORK and SCHOOL TIME OFF: depends on your specific surgery type, surrounding circumstances, and your work  insurance/school rules.  If you have questions, please call Barbara or Danna at [phone] (ext. [phone]).  Or email Barbara at [email or web address].  They will assist in required paperwork for you and/or family members.      Any other QUESTIONS or CONCERNS, please call [facility] VALERIO Ferrera at [phone].       DISCHARGE INSTRUCTIONS  GYNECOLOGICAL  PROCEDURES      For your surgery you had:  General anesthesia (you may have a sore throat for the first 24 hours)  IV sedation  Local anesthesia  Monitored anesthesia care  You received a medicated patch for nausea prevention today (behind your ear). It is recommended that you remove it 24-48 hours post-operatively. It must be removed within 72 hours.   Right ear   You received an anesthesia medication today that can cause hormonal forms of birth control to be ineffective. You should use a different form of birth control (to prevent pregnancy) for 7 days.   You may experience dizziness, drowsiness, or lightheadedness for several hours following surgery.  Do not stay alone today or tonight.  Limit your activity for 24 hours.  Resume your diet slowly.  Follow any special dietary instructions you may have been given by your doctor.  You should not drive or operate machinery, drink alcohol, or sign legally binding documents for 24 hours or while you are taking pain medication.  Last dose of pain medication was given at:  .  NOTIFY YOUR DOCTOR IF YOU EXPERIENCE ANY OF THE FOLLOWING:  Temperature greater than 101 degrees Fahrenheit  Shaking Chills  Redness or excessive drainage from incision  Nausea, vomiting and/or pain that is not controlled by prescribed medications  Increase in bleeding or bleeding that is excessive  Unable to urinate in 6 hours after surgery  If unable to reach your doctor, please go to the closest Emergency Room [x] Remove dressing in:  24 hours    [x] You may resume intercourse and the use of tampons as your physician has instructed you.  [x] Nothing in  the vagina for 1 week to include intercourse, douches, or tampons.  [x] Vaginal bleeding may be expected for several days with flow decreasing with time and never any heavier than a normal   period.  If you have an ablation, vaginal discharge is expected after bleeding stops.   If you have foul smelling discharge, notify your physician.  Medications per physician instructions as indicated on Discharge Medication Information Sheet.  You should see Dr. Sanchez  for follow-up care   on   .  Phone number:      SPECIAL INSTRUCTIONS:

## 2025-05-09 LAB
CYTO UR: NORMAL
LAB AP CASE REPORT: NORMAL
LAB AP CLINICAL INFORMATION: NORMAL
PATH REPORT.FINAL DX SPEC: NORMAL
PATH REPORT.GROSS SPEC: NORMAL

## 2025-06-05 ENCOUNTER — TELEPHONE (OUTPATIENT)
Dept: OBSTETRICS AND GYNECOLOGY | Age: 30
End: 2025-06-05
Payer: COMMERCIAL

## 2025-07-09 RX ORDER — FERROUS SULFATE 325(65) MG
325 TABLET ORAL EVERY OTHER DAY
Qty: 30 TABLET | Refills: 1 | OUTPATIENT
Start: 2025-07-09

## 2025-08-18 RX ORDER — FERROUS SULFATE 325(65) MG
325 TABLET ORAL EVERY OTHER DAY
Qty: 30 TABLET | Refills: 1 | Status: SHIPPED | OUTPATIENT
Start: 2025-08-18

## (undated) DEVICE — SLV SCD KN/LEN ADJ EXPRSS BLENDED MD 1P/U

## (undated) DEVICE — SYR LL TP 10ML STRL

## (undated) DEVICE — ADHS LIQ MASTISOL 2/3ML

## (undated) DEVICE — APPL CHLORAPREP HI/LITE 26ML ORNG

## (undated) DEVICE — INTENDED FOR TISSUE SEPARATION, AND OTHER PROCEDURES THAT REQUIRE A SHARP SURGICAL BLADE TO PUNCTURE OR CUT.: Brand: BARD-PARKER ® CARBON RIB-BACK BLADES

## (undated) DEVICE — STERILE POLYISOPRENE POWDER-FREE SURGICAL GLOVES WITH EMOLLIENT COATING: Brand: PROTEXIS

## (undated) DEVICE — GOWN,SIRUS,POLYRNF,BRTHSLV,XLN/XXL,18/CS: Brand: MEDLINE

## (undated) DEVICE — COVADERM PLUS: Brand: DEROYAL

## (undated) DEVICE — Device

## (undated) DEVICE — INSUFFLATION NEEDLE TO ESTABLISH PNEUMOPERITONEUM.: Brand: INSUFFLATION NEEDLE

## (undated) DEVICE — DUAL LUMEN STOMACH TUBE: Brand: SALEM SUMP

## (undated) DEVICE — SUT MNCRYL PLS ANTIB UD 4/0 PS2 18IN

## (undated) DEVICE — LITHOTOMY-YELLOW FINS: Brand: MEDLINE INDUSTRIES, INC.

## (undated) DEVICE — TROCAR: Brand: KII OPTICAL ACCESS SYSTEM

## (undated) DEVICE — STERILE POLYISOPRENE POWDER-FREE SURGICAL GLOVES: Brand: PROTEXIS

## (undated) DEVICE — ENDOPATH XCEL WITH OPTIVIEW TECHNOLOGY BLADELESS TROCARS WITH STABILITY SLEEVES: Brand: ENDOPATH XCEL OPTIVIEW

## (undated) DEVICE — MARYLAND JAW LAPAROSCOPIC SEALER/DIVIDER COATED: Brand: LIGASURE

## (undated) DEVICE — ST TBG CONN PNEUMOCLEAR EVAC SMOKE HEAT/HUMID

## (undated) DEVICE — SPNG GZ 2S 2X2 4PLY STRL PK/2

## (undated) DEVICE — 40585 XL ADVANCED TRENDELENBURG POSITIONING KIT: Brand: 40585 XL ADVANCED TRENDELENBURG POSITIONING KIT

## (undated) DEVICE — PCH SURG INST LAP 2 LF

## (undated) DEVICE — HYPODERMIC SAFETY NEEDLE: Brand: MONOJECT

## (undated) DEVICE — PREP TRAY WITH CHG: Brand: MEDLINE INDUSTRIES, INC.

## (undated) DEVICE — STRIP,CLOSURE,WOUND,MEDI-STRIP,1/2X4: Brand: MEDLINE

## (undated) DEVICE — 1LYRTR 16FR10ML100%SIL UMS SNP: Brand: MEDLINE INDUSTRIES, INC.

## (undated) DEVICE — ENDOPATH XCEL BLADELESS TROCARS WITH STABILITY SLEEVES: Brand: ENDOPATH XCEL

## (undated) DEVICE — SHEET,DRAPE,70X85,STERILE: Brand: MEDLINE

## (undated) DEVICE — KIT,ANTI FOG,W/SPONGE & FLUID,SOFT PACK: Brand: MEDLINE